# Patient Record
Sex: MALE | Race: WHITE | NOT HISPANIC OR LATINO | Employment: FULL TIME | URBAN - METROPOLITAN AREA
[De-identification: names, ages, dates, MRNs, and addresses within clinical notes are randomized per-mention and may not be internally consistent; named-entity substitution may affect disease eponyms.]

---

## 2023-02-28 LAB
ALANINE AMINOTRANSFERASE (SGPT) (U/L) IN SER/PLAS: 19 U/L (ref 10–52)
ALBUMIN (G/DL) IN SER/PLAS: 4.1 G/DL (ref 3.4–5)
ALKALINE PHOSPHATASE (U/L) IN SER/PLAS: 36 U/L (ref 33–136)
ANION GAP IN SER/PLAS: 9 MMOL/L (ref 10–20)
ASPARTATE AMINOTRANSFERASE (SGOT) (U/L) IN SER/PLAS: 19 U/L (ref 9–39)
BILIRUBIN TOTAL (MG/DL) IN SER/PLAS: 0.8 MG/DL (ref 0–1.2)
CALCIUM (MG/DL) IN SER/PLAS: 9.3 MG/DL (ref 8.6–10.3)
CARBON DIOXIDE, TOTAL (MMOL/L) IN SER/PLAS: 33 MMOL/L (ref 21–32)
CHLORIDE (MMOL/L) IN SER/PLAS: 98 MMOL/L (ref 98–107)
CHOLESTEROL (MG/DL) IN SER/PLAS: 89 MG/DL (ref 0–199)
CHOLESTEROL IN HDL (MG/DL) IN SER/PLAS: 39.9 MG/DL
CHOLESTEROL/HDL RATIO: 2.2
CREATININE (MG/DL) IN SER/PLAS: 0.96 MG/DL (ref 0.5–1.3)
ESTIMATED AVERAGE GLUCOSE FOR HBA1C: 226 MG/DL
GFR MALE: 85 ML/MIN/1.73M2
GLUCOSE (MG/DL) IN SER/PLAS: 197 MG/DL (ref 74–99)
HEMOGLOBIN A1C/HEMOGLOBIN TOTAL IN BLOOD: 9.5 %
LDL: 30 MG/DL (ref 0–99)
POTASSIUM (MMOL/L) IN SER/PLAS: 4 MMOL/L (ref 3.5–5.3)
PROTEIN TOTAL: 7.4 G/DL (ref 6.4–8.2)
SODIUM (MMOL/L) IN SER/PLAS: 136 MMOL/L (ref 136–145)
TRIGLYCERIDE (MG/DL) IN SER/PLAS: 95 MG/DL (ref 0–149)
UREA NITROGEN (MG/DL) IN SER/PLAS: 13 MG/DL (ref 6–23)
VLDL: 19 MG/DL (ref 0–40)

## 2023-03-14 DIAGNOSIS — I10 PRIMARY HYPERTENSION: Primary | ICD-10-CM

## 2023-03-14 DIAGNOSIS — E78.2 HYPERLIPIDEMIA, MIXED: ICD-10-CM

## 2023-03-14 RX ORDER — HYDROCHLOROTHIAZIDE 12.5 MG/1
TABLET ORAL
COMMUNITY
Start: 2013-06-14 | End: 2023-03-14 | Stop reason: SDUPTHER

## 2023-03-14 RX ORDER — OMEGA-3-ACID ETHYL ESTERS 1 G/1
CAPSULE, LIQUID FILLED ORAL
COMMUNITY
Start: 2007-11-30 | End: 2023-03-14 | Stop reason: SDUPTHER

## 2023-03-15 RX ORDER — OMEGA-3-ACID ETHYL ESTERS 1 G/1
1 CAPSULE, LIQUID FILLED ORAL DAILY
Qty: 90 CAPSULE | Refills: 3 | Status: SHIPPED | OUTPATIENT
Start: 2023-03-15 | End: 2024-02-07 | Stop reason: SDUPTHER

## 2023-03-15 RX ORDER — HYDROCHLOROTHIAZIDE 12.5 MG/1
12.5 TABLET ORAL DAILY
Qty: 90 TABLET | Refills: 3 | Status: SHIPPED | OUTPATIENT
Start: 2023-03-15 | End: 2023-07-03 | Stop reason: SDUPTHER

## 2023-03-20 DIAGNOSIS — E11.9 TYPE 2 DIABETES MELLITUS WITHOUT COMPLICATION, WITH LONG-TERM CURRENT USE OF INSULIN (MULTI): Primary | ICD-10-CM

## 2023-03-20 DIAGNOSIS — Z79.4 TYPE 2 DIABETES MELLITUS WITHOUT COMPLICATION, WITH LONG-TERM CURRENT USE OF INSULIN (MULTI): Primary | ICD-10-CM

## 2023-03-21 RX ORDER — (INSULIN DEGLUDEC AND LIRAGLUTIDE) 100; 3.6 [IU]/ML; MG/ML
INJECTION, SOLUTION SUBCUTANEOUS
Qty: 45 ML | Refills: 3 | Status: SHIPPED
Start: 2023-03-21 | End: 2024-02-07 | Stop reason: ALTCHOICE

## 2023-03-21 RX ORDER — (INSULIN DEGLUDEC AND LIRAGLUTIDE) 100; 3.6 [IU]/ML; MG/ML
INJECTION, SOLUTION SUBCUTANEOUS
COMMUNITY
Start: 2018-03-09 | End: 2023-07-03 | Stop reason: SDUPTHER

## 2023-07-03 ENCOUNTER — OFFICE VISIT (OUTPATIENT)
Dept: PRIMARY CARE | Facility: CLINIC | Age: 68
End: 2023-07-03
Payer: COMMERCIAL

## 2023-07-03 VITALS
TEMPERATURE: 97.5 F | HEIGHT: 76 IN | OXYGEN SATURATION: 97 % | BODY MASS INDEX: 32.15 KG/M2 | DIASTOLIC BLOOD PRESSURE: 62 MMHG | WEIGHT: 264 LBS | HEART RATE: 77 BPM | SYSTOLIC BLOOD PRESSURE: 100 MMHG

## 2023-07-03 DIAGNOSIS — E11.65 CONTROLLED TYPE 2 DIABETES MELLITUS WITH HYPERGLYCEMIA, WITH LONG-TERM CURRENT USE OF INSULIN (MULTI): Primary | ICD-10-CM

## 2023-07-03 DIAGNOSIS — Z79.4 CONTROLLED TYPE 2 DIABETES MELLITUS WITH HYPERGLYCEMIA, WITH LONG-TERM CURRENT USE OF INSULIN (MULTI): Primary | ICD-10-CM

## 2023-07-03 DIAGNOSIS — E78.2 MIXED HYPERLIPIDEMIA: ICD-10-CM

## 2023-07-03 DIAGNOSIS — I10 PRIMARY HYPERTENSION: ICD-10-CM

## 2023-07-03 DIAGNOSIS — I48.0 PAROXYSMAL ATRIAL FIBRILLATION (MULTI): ICD-10-CM

## 2023-07-03 DIAGNOSIS — J30.9 CHRONIC ALLERGIC RHINITIS: ICD-10-CM

## 2023-07-03 PROBLEM — E11.40 TYPE 2 DIABETES MELLITUS WITH DIABETIC NEUROPATHY, UNSPECIFIED (MULTI): Status: ACTIVE | Noted: 2020-04-24

## 2023-07-03 PROBLEM — E66.9 OBESITY (BMI 30-39.9): Status: ACTIVE | Noted: 2023-07-03

## 2023-07-03 PROBLEM — I50.22 CHRONIC SYSTOLIC HEART FAILURE (MULTI): Status: ACTIVE | Noted: 2023-07-03

## 2023-07-03 PROBLEM — J30.2 SEASONAL ALLERGIES: Status: ACTIVE | Noted: 2023-07-03

## 2023-07-03 PROCEDURE — 4010F ACE/ARB THERAPY RXD/TAKEN: CPT | Performed by: FAMILY MEDICINE

## 2023-07-03 PROCEDURE — 3074F SYST BP LT 130 MM HG: CPT | Performed by: FAMILY MEDICINE

## 2023-07-03 PROCEDURE — 3046F HEMOGLOBIN A1C LEVEL >9.0%: CPT | Performed by: FAMILY MEDICINE

## 2023-07-03 PROCEDURE — 99214 OFFICE O/P EST MOD 30 MIN: CPT | Performed by: FAMILY MEDICINE

## 2023-07-03 PROCEDURE — 1036F TOBACCO NON-USER: CPT | Performed by: FAMILY MEDICINE

## 2023-07-03 PROCEDURE — 3078F DIAST BP <80 MM HG: CPT | Performed by: FAMILY MEDICINE

## 2023-07-03 PROCEDURE — 1159F MED LIST DOCD IN RCRD: CPT | Performed by: FAMILY MEDICINE

## 2023-07-03 RX ORDER — LISINOPRIL 40 MG/1
40 TABLET ORAL DAILY
COMMUNITY
Start: 2009-07-17 | End: 2023-07-03 | Stop reason: SDUPTHER

## 2023-07-03 RX ORDER — LANCETS 33 GAUGE
EACH MISCELLANEOUS
COMMUNITY
Start: 2022-11-07

## 2023-07-03 RX ORDER — RIVAROXABAN 20 MG/1
20 TABLET, FILM COATED ORAL
Qty: 90 TABLET | Refills: 3 | Status: SHIPPED | OUTPATIENT
Start: 2023-07-03 | End: 2023-11-17 | Stop reason: SDUPTHER

## 2023-07-03 RX ORDER — BLOOD SUGAR DIAGNOSTIC
STRIP MISCELLANEOUS
COMMUNITY
Start: 2022-11-07

## 2023-07-03 RX ORDER — AMLODIPINE BESYLATE 5 MG/1
5 TABLET ORAL DAILY
COMMUNITY
Start: 2023-06-19 | End: 2023-11-17 | Stop reason: SDUPTHER

## 2023-07-03 RX ORDER — INSULIN ASPART 100 [IU]/ML
15 INJECTION, SOLUTION INTRAVENOUS; SUBCUTANEOUS
COMMUNITY
Start: 2015-01-16 | End: 2023-12-21

## 2023-07-03 RX ORDER — ATORVASTATIN CALCIUM TRIHYDRATE 20 MG/1
20 TABLET, FILM COATED ORAL DAILY
COMMUNITY
Start: 2011-10-21 | End: 2023-07-03 | Stop reason: SDUPTHER

## 2023-07-03 RX ORDER — RIVAROXABAN 20 MG/1
20 TABLET, FILM COATED ORAL
COMMUNITY
Start: 2022-01-05 | End: 2023-07-03 | Stop reason: SDUPTHER

## 2023-07-03 RX ORDER — FLUTICASONE PROPIONATE 50 MCG
2 SPRAY, SUSPENSION (ML) NASAL DAILY
COMMUNITY
Start: 2015-07-10 | End: 2023-07-03 | Stop reason: SDUPTHER

## 2023-07-03 RX ORDER — ATORVASTATIN CALCIUM 20 MG/1
20 TABLET, FILM COATED ORAL DAILY
Qty: 90 TABLET | Refills: 3 | Status: SHIPPED | OUTPATIENT
Start: 2023-07-03 | End: 2023-11-17 | Stop reason: SDUPTHER

## 2023-07-03 RX ORDER — METOPROLOL SUCCINATE 25 MG/1
25 TABLET, EXTENDED RELEASE ORAL DAILY
COMMUNITY
Start: 2020-10-14 | End: 2023-11-07 | Stop reason: SDUPTHER

## 2023-07-03 RX ORDER — NAPROXEN SODIUM 220 MG/1
81 TABLET, FILM COATED ORAL DAILY
COMMUNITY
Start: 2006-03-17

## 2023-07-03 RX ORDER — FLUTICASONE PROPIONATE 50 MCG
2 SPRAY, SUSPENSION (ML) NASAL DAILY
Qty: 48 G | Refills: 3 | Status: SHIPPED | OUTPATIENT
Start: 2023-07-03 | End: 2024-07-02

## 2023-07-03 RX ORDER — GABAPENTIN 300 MG/1
300 CAPSULE ORAL 3 TIMES DAILY
COMMUNITY
Start: 2020-04-24 | End: 2023-11-07 | Stop reason: ALTCHOICE

## 2023-07-03 RX ORDER — LISINOPRIL 40 MG/1
40 TABLET ORAL DAILY
Qty: 90 TABLET | Refills: 3 | Status: SHIPPED | OUTPATIENT
Start: 2023-07-03 | End: 2023-11-17 | Stop reason: SDUPTHER

## 2023-07-03 RX ORDER — HYDROCHLOROTHIAZIDE 12.5 MG/1
12.5 TABLET ORAL DAILY
Qty: 90 TABLET | Refills: 3 | Status: SHIPPED | OUTPATIENT
Start: 2023-07-03 | End: 2023-11-17 | Stop reason: SDUPTHER

## 2023-07-03 RX ORDER — METFORMIN HYDROCHLORIDE 1000 MG/1
1000 TABLET ORAL
COMMUNITY
Start: 2012-10-12 | End: 2023-09-05

## 2023-07-03 RX ORDER — PEN NEEDLE, DIABETIC 30 GX3/16"
NEEDLE, DISPOSABLE MISCELLANEOUS
Qty: 400 EACH | Refills: 3 | Status: SHIPPED | OUTPATIENT
Start: 2023-07-03

## 2023-07-03 RX ORDER — ACARBOSE 50 MG/1
50 TABLET ORAL 2 TIMES DAILY
COMMUNITY
Start: 2012-12-04 | End: 2023-11-17 | Stop reason: WASHOUT

## 2023-07-03 ASSESSMENT — ENCOUNTER SYMPTOMS: HYPERTENSION: 1

## 2023-07-03 NOTE — PROGRESS NOTES
Subjective   Patient ID: Lux Camejo is a 68 y.o. male who presents for Diabetes (Recheck), Hypertension, and Hyperlipidemia.  Diabetes    Hypertension    Hyperlipidemia      HTN: well controlled  2. DM2: A1c was 9.5.  Not giving himself mealtime insulin until 2 hrs later.  Eating more carbs in his diet.    3. Lipids: well controlled  4. Parox afib: controlled  5. AR: needs flonase refilled    Patient Active Problem List   Diagnosis    Paroxysmal atrial fibrillation (CMS/[facility])    Gastroesophageal reflux disease    Chronic systolic heart failure (CMS/[facility])    COPD (chronic obstructive pulmonary disease) (CMS/[facility])    Diabetes mellitus (CMS/[facility])    Hyperlipidemia    Essential hypertension    Obesity (BMI 30-39.9)    Obstructive sleep apnea    Seasonal allergies    Type 2 diabetes mellitus with diabetic neuropathy, unspecified (CMS/[facility])       Social Connections: Not on file       Current Outpatient Medications on File Prior to Visit   Medication Sig Dispense Refill    amLODIPine (Norvasc) 5 mg tablet Take 1 tablet (5 mg) by mouth once daily.      aspirin 81 mg chewable tablet Chew 1 tablet (81 mg) once daily.      fluticasone (Flonase) 50 mcg/actuation nasal spray Administer 2 sprays into each nostril once daily.      gabapentin (Neurontin) 300 mg capsule Take 1 capsule (300 mg) by mouth 3 times a day.      hydroCHLOROthiazide (HYDRODiuril) 12.5 mg tablet Take 1 tablet (12.5 mg) by mouth once daily. 90 tablet 3    Lipitor 20 mg tablet Take 1 tablet (20 mg) by mouth once daily.      lisinopril 40 mg tablet Take 1 tablet (40 mg) by mouth once daily.      metFORMIN (Glucophage) 1,000 mg tablet Take 1 tablet (1,000 mg) by mouth 2 times a day with meals.      metoprolol succinate XL (Toprol-XL) 25 mg 24 hr tablet Take 1 tablet (25 mg) by mouth once daily.      NovoLOG FlexPen U-100 Insulin 100 unit/mL (3 mL) pen Inject 15 Units under the skin 3 times a day before meals.      omega-3 acid ethyl esters (Lovaza) 1 gram capsule  Take 1 capsule (1 g) by mouth once daily. 90 capsule 3    OneTouch Delica Plus Lancet 30 gauge misc       OneTouch Ultra Test strip       Xarelto 20 mg tablet Take 1 tablet (20 mg) by mouth once daily in the evening. Take with meals.      Xultophy 100/3.6 100 unit-3.6 mg /mL (3 mL) insulin pen START WITH 40 UNITS DAILY AND INCREASE BY 2 UNITS A DAY UNTIL FASTING BLOOD SUGARS ARE LESS THAN 140 45 mL 3    acarbose (Precose) 50 mg tablet Take 1 tablet (50 mg) by mouth 2 times a day.      Xultophy 100/3.6 100 unit-3.6 mg /mL (3 mL) insulin pen        No current facility-administered medications on file prior to visit.        Vitals:    07/03/23 0825   BP: 100/62   Pulse: 77   Temp: 36.4 °C (97.5 °F)   SpO2: 97%     Vitals:    07/03/23 0825   Weight: 120 kg (264 lb)       Review of Systems   All other systems reviewed and are negative.      Objective     Physical Exam  Constitutional:       Appearance: Normal appearance. He is well-developed.   HENT:      Head: Atraumatic.   Cardiovascular:      Rate and Rhythm: Normal rate and regular rhythm.      Heart sounds: Normal heart sounds. No murmur heard.  Pulmonary:      Effort: Pulmonary effort is normal.      Breath sounds: Normal breath sounds.   Abdominal:      General: Bowel sounds are normal.      Palpations: Abdomen is soft.   Skin:     General: Skin is warm.   Neurological:      General: No focal deficit present.      Mental Status: He is alert.   Psychiatric:         Mood and Affect: Mood normal.         No visits with results within 2 Month(s) from this visit.   Latest known visit with results is:   Orders Only on 02/28/2023   Component Date Value Ref Range Status    Hemoglobin A1C 02/28/2023 9.5 (A)  % Final    Comment:      Diagnosis of Diabetes-Adults   Non-Diabetic: < or = 5.6%   Increased risk for developing diabetes: 5.7-6.4%   Diagnostic of diabetes: > or = 6.5%  .       Monitoring of Diabetes                Age (y)     Therapeutic Goal (%)   Adults:           ">18           <7.0   Pediatrics:    13-18           <7.5                   7-12           <8.0                   0- 6            7.5-8.5   American Diabetes Association. Diabetes Care 33(S1), Jan 2010.      Estimated Average Glucose 02/28/2023 226  MG/DL Final       Assessment/Plan   Problem List Items Addressed This Visit       Paroxysmal atrial fibrillation (CMS/[facility])    Relevant Medications    metoprolol succinate XL (Toprol-XL) 25 mg 24 hr tablet    amLODIPine (Norvasc) 5 mg tablet    Xarelto 20 mg tablet    Hyperlipidemia    Relevant Medications    atorvastatin (Lipitor) 20 mg tablet     Other Visit Diagnoses       Controlled type 2 diabetes mellitus with hyperglycemia, with long-term current use of insulin (CMS/[facility])    -  Primary    Relevant Medications    pen needle, diabetic 32 gauge x 5/32\" needle    Other Relevant Orders    Comprehensive Metabolic Panel    Hemoglobin A1C    Lipid Panel    Primary hypertension        Relevant Medications    hydroCHLOROthiazide (HYDRODiuril) 12.5 mg tablet    lisinopril 40 mg tablet    Chronic allergic rhinitis        Relevant Medications    fluticasone (Flonase) 50 mcg/actuation nasal spray          Refilled pts meds.  Encouraged mealtime insulin.  Fu in 4 mo.  "

## 2023-09-01 DIAGNOSIS — E11.65 CONTROLLED TYPE 2 DIABETES MELLITUS WITH HYPERGLYCEMIA, WITH LONG-TERM CURRENT USE OF INSULIN (MULTI): Primary | ICD-10-CM

## 2023-09-01 DIAGNOSIS — Z79.4 CONTROLLED TYPE 2 DIABETES MELLITUS WITH HYPERGLYCEMIA, WITH LONG-TERM CURRENT USE OF INSULIN (MULTI): Primary | ICD-10-CM

## 2023-09-05 RX ORDER — METFORMIN HYDROCHLORIDE 1000 MG/1
1000 TABLET ORAL 2 TIMES DAILY
Qty: 180 TABLET | Refills: 3 | Status: SHIPPED | OUTPATIENT
Start: 2023-09-05 | End: 2024-05-08 | Stop reason: SDUPTHER

## 2023-11-07 ENCOUNTER — LAB (OUTPATIENT)
Dept: LAB | Facility: LAB | Age: 68
End: 2023-11-07
Payer: COMMERCIAL

## 2023-11-07 ENCOUNTER — OFFICE VISIT (OUTPATIENT)
Dept: PRIMARY CARE | Facility: CLINIC | Age: 68
End: 2023-11-07
Payer: COMMERCIAL

## 2023-11-07 VITALS
HEART RATE: 63 BPM | SYSTOLIC BLOOD PRESSURE: 126 MMHG | DIASTOLIC BLOOD PRESSURE: 78 MMHG | BODY MASS INDEX: 32.59 KG/M2 | WEIGHT: 266 LBS | TEMPERATURE: 97.3 F | OXYGEN SATURATION: 98 %

## 2023-11-07 DIAGNOSIS — E11.65 CONTROLLED TYPE 2 DIABETES MELLITUS WITH HYPERGLYCEMIA, WITH LONG-TERM CURRENT USE OF INSULIN (MULTI): ICD-10-CM

## 2023-11-07 DIAGNOSIS — Z79.4 CONTROLLED TYPE 2 DIABETES MELLITUS WITH HYPERGLYCEMIA, WITH LONG-TERM CURRENT USE OF INSULIN (MULTI): ICD-10-CM

## 2023-11-07 DIAGNOSIS — Z12.5 SCREENING FOR PROSTATE CANCER: ICD-10-CM

## 2023-11-07 DIAGNOSIS — M79.604 PAIN IN BOTH LOWER EXTREMITIES: ICD-10-CM

## 2023-11-07 DIAGNOSIS — M79.605 PAIN IN BOTH LOWER EXTREMITIES: ICD-10-CM

## 2023-11-07 DIAGNOSIS — E11.59 TYPE 2 DIABETES MELLITUS WITH OTHER CIRCULATORY COMPLICATION, WITH LONG-TERM CURRENT USE OF INSULIN (MULTI): ICD-10-CM

## 2023-11-07 DIAGNOSIS — I48.0 PAROXYSMAL ATRIAL FIBRILLATION (MULTI): ICD-10-CM

## 2023-11-07 DIAGNOSIS — Z79.4 TYPE 2 DIABETES MELLITUS WITH OTHER CIRCULATORY COMPLICATION, WITH LONG-TERM CURRENT USE OF INSULIN (MULTI): ICD-10-CM

## 2023-11-07 DIAGNOSIS — G62.9 NEUROPATHY: ICD-10-CM

## 2023-11-07 DIAGNOSIS — I10 PRIMARY HYPERTENSION: Primary | ICD-10-CM

## 2023-11-07 LAB
ALBUMIN SERPL BCP-MCNC: 3.9 G/DL (ref 3.4–5)
ALP SERPL-CCNC: 37 U/L (ref 33–136)
ALT SERPL W P-5'-P-CCNC: 17 U/L (ref 10–52)
ANION GAP SERPL CALC-SCNC: 10 MMOL/L (ref 10–20)
AST SERPL W P-5'-P-CCNC: 18 U/L (ref 9–39)
BILIRUB SERPL-MCNC: 0.6 MG/DL (ref 0–1.2)
BUN SERPL-MCNC: 15 MG/DL (ref 6–23)
CALCIUM SERPL-MCNC: 9 MG/DL (ref 8.6–10.3)
CHLORIDE SERPL-SCNC: 102 MMOL/L (ref 98–107)
CHOLEST SERPL-MCNC: 98 MG/DL (ref 0–199)
CHOLESTEROL/HDL RATIO: 2.5
CO2 SERPL-SCNC: 31 MMOL/L (ref 21–32)
CREAT SERPL-MCNC: 0.77 MG/DL (ref 0.5–1.3)
EST. AVERAGE GLUCOSE BLD GHB EST-MCNC: 189 MG/DL
GFR SERPL CREATININE-BSD FRML MDRD: >90 ML/MIN/1.73M*2
GLUCOSE SERPL-MCNC: 169 MG/DL (ref 74–99)
HBA1C MFR BLD: 8.2 %
HDLC SERPL-MCNC: 38.9 MG/DL
LDLC SERPL CALC-MCNC: 38 MG/DL
NON HDL CHOLESTEROL: 59 MG/DL (ref 0–149)
POTASSIUM SERPL-SCNC: 4.4 MMOL/L (ref 3.5–5.3)
PROT SERPL-MCNC: 6.7 G/DL (ref 6.4–8.2)
PSA SERPL-MCNC: 0.67 NG/ML
SODIUM SERPL-SCNC: 139 MMOL/L (ref 136–145)
TRIGL SERPL-MCNC: 104 MG/DL (ref 0–149)
VLDL: 21 MG/DL (ref 0–40)

## 2023-11-07 PROCEDURE — 1159F MED LIST DOCD IN RCRD: CPT | Performed by: FAMILY MEDICINE

## 2023-11-07 PROCEDURE — 3078F DIAST BP <80 MM HG: CPT | Performed by: FAMILY MEDICINE

## 2023-11-07 PROCEDURE — 80061 LIPID PANEL: CPT

## 2023-11-07 PROCEDURE — 4010F ACE/ARB THERAPY RXD/TAKEN: CPT | Performed by: FAMILY MEDICINE

## 2023-11-07 PROCEDURE — 3046F HEMOGLOBIN A1C LEVEL >9.0%: CPT | Performed by: FAMILY MEDICINE

## 2023-11-07 PROCEDURE — 80053 COMPREHEN METABOLIC PANEL: CPT

## 2023-11-07 PROCEDURE — 36415 COLL VENOUS BLD VENIPUNCTURE: CPT

## 2023-11-07 PROCEDURE — 83036 HEMOGLOBIN GLYCOSYLATED A1C: CPT

## 2023-11-07 PROCEDURE — 1036F TOBACCO NON-USER: CPT | Performed by: FAMILY MEDICINE

## 2023-11-07 PROCEDURE — 84153 ASSAY OF PSA TOTAL: CPT

## 2023-11-07 PROCEDURE — 3074F SYST BP LT 130 MM HG: CPT | Performed by: FAMILY MEDICINE

## 2023-11-07 PROCEDURE — 99214 OFFICE O/P EST MOD 30 MIN: CPT | Performed by: FAMILY MEDICINE

## 2023-11-07 RX ORDER — METOPROLOL SUCCINATE 25 MG/1
25 TABLET, EXTENDED RELEASE ORAL DAILY
Qty: 90 TABLET | Refills: 1 | Status: SHIPPED | OUTPATIENT
Start: 2023-11-07 | End: 2023-11-17 | Stop reason: SDUPTHER

## 2023-11-07 RX ORDER — GABAPENTIN 400 MG/1
400 CAPSULE ORAL 3 TIMES DAILY
Qty: 270 CAPSULE | Refills: 1 | Status: SHIPPED | OUTPATIENT
Start: 2023-11-07 | End: 2024-05-08 | Stop reason: SDUPTHER

## 2023-11-07 NOTE — PROGRESS NOTES
Subjective   Patient ID: Lux Camejo is a 68 y.o. male who presents for Diabetes (Recheck.).  Diabetes      HPI: Diabetes, hypertension and increased cholesterol.       All  Diabetes: No medication side effects noted. Trying to follow recommended diet. Patient is exercising. Hypoglycemic Episodes: None.    End All         Lipids well controlled last check, checking today.        Afib: stable.      DM2 is pending.      Patient Active Problem List   Diagnosis    Paroxysmal atrial fibrillation (CMS/HCC)    Gastroesophageal reflux disease    Chronic systolic heart failure (CMS/HCC)    COPD (chronic obstructive pulmonary disease) (CMS/HCC)    Diabetes mellitus (CMS/HCC)    Hyperlipidemia    Essential hypertension    Obesity (BMI 30-39.9)    Obstructive sleep apnea    Seasonal allergies    Type 2 diabetes mellitus with diabetic neuropathy, unspecified (CMS/[facility])       Social Connections: Not on file       Current Outpatient Medications on File Prior to Visit   Medication Sig Dispense Refill    acarbose (Precose) 50 mg tablet Take 1 tablet (50 mg) by mouth 2 times a day.      amLODIPine (Norvasc) 5 mg tablet Take 1 tablet (5 mg) by mouth once daily.      aspirin 81 mg chewable tablet Chew 1 tablet (81 mg) once daily.      atorvastatin (Lipitor) 20 mg tablet Take 1 tablet (20 mg) by mouth once daily. 90 tablet 3    fluticasone (Flonase) 50 mcg/actuation nasal spray Administer 2 sprays into each nostril once daily. 48 g 3    gabapentin (Neurontin) 300 mg capsule Take 1 capsule (300 mg) by mouth 3 times a day.      hydroCHLOROthiazide (HYDRODiuril) 12.5 mg tablet Take 1 tablet (12.5 mg) by mouth once daily. 90 tablet 3    lisinopril 40 mg tablet Take 1 tablet (40 mg) by mouth once daily. 90 tablet 3    metFORMIN (Glucophage) 1,000 mg tablet TAKE ONE TABLET BY MOUTH TWICE A  tablet 3    metoprolol succinate XL (Toprol-XL) 25 mg 24 hr tablet Take 1 tablet (25 mg) by mouth once daily.      NovoLOG FlexPen U-100 Insulin  "100 unit/mL (3 mL) pen Inject 15 Units under the skin 3 times a day before meals.      omega-3 acid ethyl esters (Lovaza) 1 gram capsule Take 1 capsule (1 g) by mouth once daily. 90 capsule 3    OneTouch Delica Plus Lancet 30 gauge misc       OneTouch Ultra Test strip       pen needle, diabetic 32 gauge x 5/32\" needle To be used 4x a day 400 each 3    Xarelto 20 mg tablet Take 1 tablet (20 mg) by mouth once daily in the evening. Take with meals. 90 tablet 3    Xultophy 100/3.6 100 unit-3.6 mg /mL (3 mL) insulin pen START WITH 40 UNITS DAILY AND INCREASE BY 2 UNITS A DAY UNTIL FASTING BLOOD SUGARS ARE LESS THAN 140 45 mL 3     No current facility-administered medications on file prior to visit.        Vitals:    11/07/23 0805   BP: 126/78   Pulse: 63   Temp: 36.3 °C (97.3 °F)   SpO2: 98%     Vitals:    11/07/23 0805   Weight: 121 kg (266 lb)       Review of Systems   All other systems reviewed and are negative.      Objective     Physical Exam  Constitutional:       Appearance: Normal appearance. He is well-developed.   HENT:      Head: Atraumatic.   Cardiovascular:      Rate and Rhythm: Normal rate and regular rhythm.      Heart sounds: Normal heart sounds. No murmur heard.  Pulmonary:      Effort: Pulmonary effort is normal.      Breath sounds: Normal breath sounds.   Abdominal:      General: Bowel sounds are normal.      Palpations: Abdomen is soft.   Feet:      Right foot:      Skin integrity: Skin integrity normal.      Left foot:      Skin integrity: Skin integrity normal.   Skin:     General: Skin is warm.   Neurological:      General: No focal deficit present.      Mental Status: He is alert.   Psychiatric:         Mood and Affect: Mood normal.     Decreased sensation    No visits with results within 2 Month(s) from this visit.   Latest known visit with results is:   Orders Only on 02/28/2023   Component Date Value Ref Range Status    Hemoglobin A1C 02/28/2023 9.5 (A)  % Final    Comment:      Diagnosis of " Diabetes-Adults   Non-Diabetic: < or = 5.6%   Increased risk for developing diabetes: 5.7-6.4%   Diagnostic of diabetes: > or = 6.5%  .       Monitoring of Diabetes                Age (y)     Therapeutic Goal (%)   Adults:          >18           <7.0   Pediatrics:    13-18           <7.5                   7-12           <8.0                   0- 6            7.5-8.5   American Diabetes Association. Diabetes Care 33(S1), Jan 2010.      Estimated Average Glucose 02/28/2023 226  MG/DL Final       Assessment/Plan   Problem List Items Addressed This Visit    None  Visit Diagnoses         Codes    Primary hypertension    -  Primary I10    Relevant Medications    metoprolol succinate XL (Toprol-XL) 25 mg 24 hr tablet    Neuropathy     G62.9    Relevant Medications    gabapentin (Neurontin) 400 mg capsule    Pain in both lower extremities     M79.604, M79.605    Relevant Medications    gabapentin (Neurontin) 400 mg capsule    Other Relevant Orders    Vascular US ankle brachial index (SHERRON) with exercise    Screening for prostate cancer     Z12.5    Relevant Orders    Prostate Specific Antigen    Controlled type 2 diabetes mellitus with hyperglycemia, with long-term current use of insulin (CMS/[facility])     E11.65, Z79.4    Relevant Orders    Comprehensive Metabolic Panel    Hemoglobin A1C    Lipid Panel          Doing well.  Refilled meds.  Follow up in 4 mo.  Checking SHERRON for leg cramping.  Inc gabapentin.

## 2023-11-08 ENCOUNTER — TELEPHONE (OUTPATIENT)
Dept: PRIMARY CARE | Facility: CLINIC | Age: 68
End: 2023-11-08
Payer: COMMERCIAL

## 2023-11-08 NOTE — RESULT ENCOUNTER NOTE
Pts A1c was 8.2, better than last time.  Other BW looked good.  Hopefully we can get his A1c below 8 for his neuropathy.

## 2023-11-08 NOTE — TELEPHONE ENCOUNTER
----- Message from Jason De Anda MD sent at 11/7/2023  9:10 PM EST -----  Pts A1c was 8.2, better than last time.  Other BW looked good.  Hopefully we can get his A1c below 8 for his neuropathy.

## 2023-11-17 ENCOUNTER — TELEPHONE (OUTPATIENT)
Dept: PRIMARY CARE | Facility: CLINIC | Age: 68
End: 2023-11-17

## 2023-11-17 ENCOUNTER — OFFICE VISIT (OUTPATIENT)
Dept: CARDIOLOGY | Facility: CLINIC | Age: 68
End: 2023-11-17
Payer: COMMERCIAL

## 2023-11-17 ENCOUNTER — HOSPITAL ENCOUNTER (OUTPATIENT)
Dept: VASCULAR MEDICINE | Facility: HOSPITAL | Age: 68
Discharge: HOME | End: 2023-11-17
Payer: COMMERCIAL

## 2023-11-17 VITALS
HEART RATE: 90 BPM | BODY MASS INDEX: 32.32 KG/M2 | WEIGHT: 265.4 LBS | DIASTOLIC BLOOD PRESSURE: 80 MMHG | HEIGHT: 76 IN | SYSTOLIC BLOOD PRESSURE: 128 MMHG

## 2023-11-17 DIAGNOSIS — I48.0 PAROXYSMAL ATRIAL FIBRILLATION (MULTI): Primary | ICD-10-CM

## 2023-11-17 DIAGNOSIS — R94.30 ABNORMAL RESULT OF CARDIOVASCULAR FUNCTION STUDY: ICD-10-CM

## 2023-11-17 DIAGNOSIS — M79.605 PAIN IN BOTH LOWER EXTREMITIES: ICD-10-CM

## 2023-11-17 DIAGNOSIS — E78.2 MIXED HYPERLIPIDEMIA: ICD-10-CM

## 2023-11-17 DIAGNOSIS — I50.22 CHRONIC SYSTOLIC HEART FAILURE (MULTI): ICD-10-CM

## 2023-11-17 DIAGNOSIS — I73.9 PERIPHERAL VASCULAR DISEASE, UNSPECIFIED (CMS-HCC): ICD-10-CM

## 2023-11-17 DIAGNOSIS — I10 PRIMARY HYPERTENSION: ICD-10-CM

## 2023-11-17 DIAGNOSIS — M79.604 PAIN IN BOTH LOWER EXTREMITIES: ICD-10-CM

## 2023-11-17 PROCEDURE — 93922 UPR/L XTREMITY ART 2 LEVELS: CPT

## 2023-11-17 PROCEDURE — 3052F HG A1C>EQUAL 8.0%<EQUAL 9.0%: CPT | Performed by: INTERNAL MEDICINE

## 2023-11-17 PROCEDURE — 93000 ELECTROCARDIOGRAM COMPLETE: CPT | Performed by: INTERNAL MEDICINE

## 2023-11-17 PROCEDURE — 4010F ACE/ARB THERAPY RXD/TAKEN: CPT | Performed by: INTERNAL MEDICINE

## 2023-11-17 PROCEDURE — 3048F LDL-C <100 MG/DL: CPT | Performed by: INTERNAL MEDICINE

## 2023-11-17 PROCEDURE — 1036F TOBACCO NON-USER: CPT | Performed by: INTERNAL MEDICINE

## 2023-11-17 PROCEDURE — 1159F MED LIST DOCD IN RCRD: CPT | Performed by: INTERNAL MEDICINE

## 2023-11-17 PROCEDURE — 3079F DIAST BP 80-89 MM HG: CPT | Performed by: INTERNAL MEDICINE

## 2023-11-17 PROCEDURE — 99214 OFFICE O/P EST MOD 30 MIN: CPT | Performed by: INTERNAL MEDICINE

## 2023-11-17 PROCEDURE — 93922 UPR/L XTREMITY ART 2 LEVELS: CPT | Performed by: INTERNAL MEDICINE

## 2023-11-17 PROCEDURE — 3074F SYST BP LT 130 MM HG: CPT | Performed by: INTERNAL MEDICINE

## 2023-11-17 RX ORDER — ATORVASTATIN CALCIUM 20 MG/1
20 TABLET, FILM COATED ORAL DAILY
Qty: 90 TABLET | Refills: 3 | Status: SHIPPED | OUTPATIENT
Start: 2023-11-17 | End: 2024-05-08 | Stop reason: SDUPTHER

## 2023-11-17 RX ORDER — LISINOPRIL 40 MG/1
40 TABLET ORAL DAILY
Qty: 90 TABLET | Refills: 3 | Status: SHIPPED | OUTPATIENT
Start: 2023-11-17 | End: 2024-05-08 | Stop reason: SDUPTHER

## 2023-11-17 RX ORDER — METOPROLOL SUCCINATE 25 MG/1
25 TABLET, EXTENDED RELEASE ORAL DAILY
Qty: 90 TABLET | Refills: 3 | Status: SHIPPED | OUTPATIENT
Start: 2023-11-17 | End: 2024-05-08 | Stop reason: SDUPTHER

## 2023-11-17 RX ORDER — AMLODIPINE BESYLATE 5 MG/1
5 TABLET ORAL DAILY
Qty: 90 TABLET | Refills: 3 | Status: SHIPPED | OUTPATIENT
Start: 2023-11-17 | End: 2024-11-16

## 2023-11-17 RX ORDER — RIVAROXABAN 20 MG/1
20 TABLET, FILM COATED ORAL
Qty: 90 TABLET | Refills: 3 | Status: SHIPPED | OUTPATIENT
Start: 2023-11-17 | End: 2024-05-08 | Stop reason: SDUPTHER

## 2023-11-17 RX ORDER — HYDROCHLOROTHIAZIDE 12.5 MG/1
12.5 TABLET ORAL DAILY
Qty: 90 TABLET | Refills: 3 | Status: SHIPPED | OUTPATIENT
Start: 2023-11-17 | End: 2024-05-08 | Stop reason: SDUPTHER

## 2023-11-17 NOTE — PATIENT INSTRUCTIONS
Thanks for following up in office today.    1)  Let me know if you are having any worsening shortness of breath, dizziness, fast or irregular heart rates that are interfering with your activity, or chest pain.    2)  Watch out for bleeding on Xarelto, let us know if you develop this.  If you fall and hit your head, call 911.    3)  Please continue your cardiac medications as prescribed.    Follow up with Jovanny Samson in 6 months  If you have any questions, please call [phone] and choose option for Dr. Walker's nurse Shelley Devine

## 2023-11-17 NOTE — TELEPHONE ENCOUNTER
----- Message from Jason De Anda MD sent at 11/17/2023  2:43 PM EST -----  Please let patient know that his SHERRON showed good blood flow to his feet

## 2023-11-17 NOTE — PROGRESS NOTES
"Chief Complaint:   No chief complaint on file.     History Of Present Illness:    Lux Camejo is a 68 y.o. male presenting for yearly follow up  He has  h/o Mild LV dysfunction EF 45%, HTN, DL, permanent Afib, Elevated CAC Score (Total 206), CITLALLI intermittent CPAP compliance, COPD/emphysema, GERD who presents for follow up. He was originally referred for preop risk stratification prior to colonoscopy and continues to follow with us for cardiology care.    Since our last visit he tells me that he is doing well from a CV standpoint. He does have some numbness in the bottom of both feet - was told by Dr. De Anda that this is likely 2/2 diabetes.  He has an ultrasound later today to evaluate blood flow.      Can walk two flights four to five times a day without chest pain.  Feels a little winded, but this is not worsening.  No Le edema, eating a low sodium diet.  No reported irregular or fast heart rates.  Rarely he can feel a skipped beat on his pulse.    ROS:  The remainder of the review of systems was obtained, as was negative as pertains to the chief complaint.       CV Testing Reviewed Today:    Stress test October 5, 2022 shows no EKG evidence for ischemia, moderate size partially reversible perfusion defect inferior and apical walls suggesting prior infarct with small area of dwayne-infarct ischemia.     TTE 9/2020, which demonstrated LVEF 45%, RV mildly enlgd with mild RV dysfcn     CT calcium score 9/2020: Total= 206 (LM 0, LAD 1.4, LCx 123, RCA 82).      Last Recorded Vitals:  Vitals:    11/17/23 0946   BP: 128/80   Pulse: 90   Weight: 120 kg (265 lb 6.4 oz)   Height: 1.93 m (6' 4\")       Past Medical History:  He has a past medical history of Personal history of other diseases of the respiratory system and Personal history of pulmonary embolism.    Past Surgical History:  He has a past surgical history that includes Other surgical history (08/31/2020) and Other surgical history (10/14/2020).      Social " "History:  He reports that he quit smoking about 17 years ago. His smoking use included cigarettes and cigars. He smoked an average of 1 pack per day. He has never used smokeless tobacco. He reports current alcohol use of about 5.0 - 10.0 standard drinks of alcohol per week. He reports that he does not use drugs.    Family History:  Family History   Problem Relation Name Age of Onset    Cancer Mother      Heart attack Father          Allergies:  Patient has no known allergies.    Outpatient Medications:  Current Outpatient Medications   Medication Instructions    acarbose (PRECOSE) 50 mg, oral, 2 times daily    amLODIPine (NORVASC) 5 mg, oral, Daily    aspirin 81 mg, oral, Daily    atorvastatin (LIPITOR) 20 mg, oral, Daily    fluticasone (Flonase) 50 mcg/actuation nasal spray 2 sprays, Each Nostril, Daily    gabapentin (NEURONTIN) 400 mg, oral, 3 times daily    hydroCHLOROthiazide (HYDRODIURIL) 12.5 mg, oral, Daily    lisinopril 40 mg, oral, Daily    metFORMIN (GLUCOPHAGE) 1,000 mg, oral, 2 times daily    metoprolol succinate XL (TOPROL-XL) 25 mg, oral, Daily    NovoLOG Flexpen U-100 Insulin 15 Units, subcutaneous, 3 times daily before meals    omega-3 acid ethyl esters (LOVAZA) 1 g, oral, Daily    OneTouch Delica Plus Lancet 30 gauge misc     OneTouch Ultra Test strip     pen needle, diabetic 32 gauge x 5/32\" needle To be used 4x a day    Xarelto 20 mg, oral, Daily with evening meal    Xultophy 100/3.6 100 unit-3.6 mg /mL (3 mL) insulin pen START WITH 40 UNITS DAILY AND INCREASE BY 2 UNITS A DAY UNTIL FASTING BLOOD SUGARS ARE LESS THAN 140       Physical Exam:  Physical Exam  HENT:      Head: Normocephalic.      Nose: Nose normal.      Mouth/Throat:      Mouth: Mucous membranes are moist.   Eyes:      Pupils: Pupils are equal, round, and reactive to light.   Cardiovascular:      Rate and Rhythm: Normal rate. Rhythm irregularly irregular.      Comments: No significant murmur  No carotid bruits  Pulmonary:      " Effort: Pulmonary effort is normal.      Breath sounds: Normal breath sounds.   Abdominal:      Palpations: Abdomen is soft.   Musculoskeletal:         General: Normal range of motion.   Skin:     General: Skin is warm and dry.   Neurological:      General: No focal deficit present.      Mental Status: He is alert.           Last Labs:  CBC -  Lab Results   Component Value Date    WBC 8.2 03/22/2022    HGB 14.7 03/22/2022    HCT 46.6 03/22/2022    MCV 96 03/22/2022     03/22/2022       CMP -  Lab Results   Component Value Date    CALCIUM 9.0 11/07/2023    PROT 6.7 11/07/2023    ALBUMIN 3.9 11/07/2023    AST 18 11/07/2023    ALT 17 11/07/2023    ALKPHOS 37 11/07/2023    BILITOT 0.6 11/07/2023       LIPID PANEL -   Lab Results   Component Value Date    CHOL 98 11/07/2023    TRIG 104 11/07/2023    HDL 38.9 11/07/2023    CHHDL 2.5 11/07/2023    LDLF 30 02/28/2023    VLDL 21 11/07/2023    NHDL 59 11/07/2023       RENAL FUNCTION PANEL -   Lab Results   Component Value Date    GLUCOSE 169 (H) 11/07/2023     11/07/2023    K 4.4 11/07/2023     11/07/2023    CO2 31 11/07/2023    ANIONGAP 10 11/07/2023    BUN 15 11/07/2023    CREATININE 0.77 11/07/2023    GFRMALE 85 02/28/2023    CALCIUM 9.0 11/07/2023    ALBUMIN 3.9 11/07/2023        Lab Results   Component Value Date    HGBA1C 8.2 (H) 11/07/2023       Last Cardiology Tests:  ECG:  No results found for this or any previous visit from the past 1095 days.    Stress Test:  Nuclear Stress Test 3/7/2023      Assessment/Plan   Chronic atrial fibrillation:  Permanent Afib, rate controlled with some PVC's on EKG today.  -continue metoprolol for rate control  -patient is anticoagulated with Xarelto. He is not having any abnormal bleeding or bruising and will continue on anticoagulation.     Abnormal stress test:  10/22 stress test with scar with possible small area of dwayne-infarct ischemia.  No chest pain at what sounds like a moderate level of activity.     -aspirin, atorvastatin, metoprolol, amlodipine  FLP reviewed and at goal    HTN:  aspirin, atorvastatin, metoprolol, lisinopril, amlodipine, and hydrochlorothiazide.     Hyperlipidemia: Patient's recent lipid labs show excellent control  -Continue on atorvastatin 20 mg daily.

## 2023-12-20 DIAGNOSIS — E11.65 CONTROLLED TYPE 2 DIABETES MELLITUS WITH HYPERGLYCEMIA, WITH LONG-TERM CURRENT USE OF INSULIN (MULTI): Primary | ICD-10-CM

## 2023-12-20 DIAGNOSIS — Z79.4 CONTROLLED TYPE 2 DIABETES MELLITUS WITH HYPERGLYCEMIA, WITH LONG-TERM CURRENT USE OF INSULIN (MULTI): Primary | ICD-10-CM

## 2023-12-21 RX ORDER — INSULIN ASPART 100 [IU]/ML
INJECTION, SOLUTION INTRAVENOUS; SUBCUTANEOUS
Qty: 39 ML | Refills: 11 | Status: SHIPPED | OUTPATIENT
Start: 2023-12-21

## 2024-02-07 ENCOUNTER — OFFICE VISIT (OUTPATIENT)
Dept: PRIMARY CARE | Facility: CLINIC | Age: 69
End: 2024-02-07
Payer: COMMERCIAL

## 2024-02-07 ENCOUNTER — TELEPHONE (OUTPATIENT)
Dept: PRIMARY CARE | Facility: CLINIC | Age: 69
End: 2024-02-07

## 2024-02-07 ENCOUNTER — LAB (OUTPATIENT)
Dept: LAB | Facility: LAB | Age: 69
End: 2024-02-07
Payer: COMMERCIAL

## 2024-02-07 VITALS
DIASTOLIC BLOOD PRESSURE: 80 MMHG | HEART RATE: 50 BPM | OXYGEN SATURATION: 97 % | BODY MASS INDEX: 32.38 KG/M2 | TEMPERATURE: 97.4 F | SYSTOLIC BLOOD PRESSURE: 140 MMHG | WEIGHT: 266 LBS

## 2024-02-07 DIAGNOSIS — Z79.4 CONTROLLED TYPE 2 DIABETES MELLITUS WITH HYPERGLYCEMIA, WITH LONG-TERM CURRENT USE OF INSULIN (MULTI): ICD-10-CM

## 2024-02-07 DIAGNOSIS — Z79.4 CONTROLLED TYPE 2 DIABETES MELLITUS WITH HYPERGLYCEMIA, WITH LONG-TERM CURRENT USE OF INSULIN (MULTI): Primary | ICD-10-CM

## 2024-02-07 DIAGNOSIS — I10 PRIMARY HYPERTENSION: ICD-10-CM

## 2024-02-07 DIAGNOSIS — E11.65 CONTROLLED TYPE 2 DIABETES MELLITUS WITH HYPERGLYCEMIA, WITH LONG-TERM CURRENT USE OF INSULIN (MULTI): ICD-10-CM

## 2024-02-07 DIAGNOSIS — E11.65 CONTROLLED TYPE 2 DIABETES MELLITUS WITH HYPERGLYCEMIA, WITH LONG-TERM CURRENT USE OF INSULIN (MULTI): Primary | ICD-10-CM

## 2024-02-07 DIAGNOSIS — E78.2 HYPERLIPIDEMIA, MIXED: ICD-10-CM

## 2024-02-07 DIAGNOSIS — Z12.5 SCREENING FOR PROSTATE CANCER: ICD-10-CM

## 2024-02-07 LAB
ALBUMIN SERPL BCP-MCNC: 4.1 G/DL (ref 3.4–5)
ALP SERPL-CCNC: 30 U/L (ref 33–136)
ALT SERPL W P-5'-P-CCNC: 17 U/L (ref 10–52)
ANION GAP SERPL CALC-SCNC: 9 MMOL/L (ref 10–20)
AST SERPL W P-5'-P-CCNC: 17 U/L (ref 9–39)
BILIRUB SERPL-MCNC: 0.6 MG/DL (ref 0–1.2)
BUN SERPL-MCNC: 13 MG/DL (ref 6–23)
CALCIUM SERPL-MCNC: 9.3 MG/DL (ref 8.6–10.3)
CHLORIDE SERPL-SCNC: 100 MMOL/L (ref 98–107)
CHOLEST SERPL-MCNC: 87 MG/DL (ref 0–199)
CHOLESTEROL/HDL RATIO: 2.1
CO2 SERPL-SCNC: 33 MMOL/L (ref 21–32)
CREAT SERPL-MCNC: 0.81 MG/DL (ref 0.5–1.3)
EGFRCR SERPLBLD CKD-EPI 2021: >90 ML/MIN/1.73M*2
EST. AVERAGE GLUCOSE BLD GHB EST-MCNC: 214 MG/DL
GLUCOSE SERPL-MCNC: 167 MG/DL (ref 74–99)
HBA1C MFR BLD: 9.1 %
HDLC SERPL-MCNC: 41.1 MG/DL
LDLC SERPL CALC-MCNC: 30 MG/DL
NON HDL CHOLESTEROL: 46 MG/DL (ref 0–149)
POTASSIUM SERPL-SCNC: 4.3 MMOL/L (ref 3.5–5.3)
PROT SERPL-MCNC: 7.1 G/DL (ref 6.4–8.2)
PSA SERPL-MCNC: 0.52 NG/ML
SODIUM SERPL-SCNC: 138 MMOL/L (ref 136–145)
TRIGL SERPL-MCNC: 78 MG/DL (ref 0–149)
VLDL: 16 MG/DL (ref 0–40)

## 2024-02-07 PROCEDURE — 99214 OFFICE O/P EST MOD 30 MIN: CPT | Performed by: FAMILY MEDICINE

## 2024-02-07 PROCEDURE — 83036 HEMOGLOBIN GLYCOSYLATED A1C: CPT

## 2024-02-07 PROCEDURE — 80053 COMPREHEN METABOLIC PANEL: CPT

## 2024-02-07 PROCEDURE — 80061 LIPID PANEL: CPT

## 2024-02-07 PROCEDURE — 1036F TOBACCO NON-USER: CPT | Performed by: FAMILY MEDICINE

## 2024-02-07 PROCEDURE — 3077F SYST BP >= 140 MM HG: CPT | Performed by: FAMILY MEDICINE

## 2024-02-07 PROCEDURE — 4010F ACE/ARB THERAPY RXD/TAKEN: CPT | Performed by: FAMILY MEDICINE

## 2024-02-07 PROCEDURE — 1159F MED LIST DOCD IN RCRD: CPT | Performed by: FAMILY MEDICINE

## 2024-02-07 PROCEDURE — 84153 ASSAY OF PSA TOTAL: CPT

## 2024-02-07 PROCEDURE — 36415 COLL VENOUS BLD VENIPUNCTURE: CPT

## 2024-02-07 PROCEDURE — 3079F DIAST BP 80-89 MM HG: CPT | Performed by: FAMILY MEDICINE

## 2024-02-07 RX ORDER — BLOOD-GLUCOSE,RECEIVER,CONT
EACH MISCELLANEOUS
Qty: 1 EACH | Refills: 0 | Status: SHIPPED | OUTPATIENT
Start: 2024-02-07

## 2024-02-07 RX ORDER — BLOOD-GLUCOSE SENSOR
EACH MISCELLANEOUS
Qty: 6 EACH | Refills: 3 | Status: CANCELLED | OUTPATIENT
Start: 2024-02-07

## 2024-02-07 RX ORDER — TIRZEPATIDE 12.5 MG/.5ML
12.5 INJECTION, SOLUTION SUBCUTANEOUS
Qty: 6 ML | Refills: 1 | Status: SHIPPED | OUTPATIENT
Start: 2024-02-07

## 2024-02-07 RX ORDER — BLOOD-GLUCOSE SENSOR
EACH MISCELLANEOUS
Qty: 8 EACH | Refills: 3 | Status: SHIPPED | OUTPATIENT
Start: 2024-02-07

## 2024-02-07 RX ORDER — OMEGA-3-ACID ETHYL ESTERS 1 G/1
1 CAPSULE, LIQUID FILLED ORAL DAILY
Qty: 90 CAPSULE | Refills: 3 | Status: SHIPPED | OUTPATIENT
Start: 2024-02-07 | End: 2025-02-06

## 2024-02-07 RX ORDER — OMEGA-3-ACID ETHYL ESTERS 1 G/1
1 CAPSULE, LIQUID FILLED ORAL DAILY
Qty: 90 CAPSULE | Refills: 3 | Status: SHIPPED | OUTPATIENT
Start: 2024-02-07 | End: 2024-02-07 | Stop reason: SDUPTHER

## 2024-02-07 RX ORDER — BLOOD-GLUCOSE SENSOR
EACH MISCELLANEOUS
Qty: 6 EACH | Refills: 3 | Status: SHIPPED | OUTPATIENT
Start: 2024-02-07

## 2024-02-07 NOTE — TELEPHONE ENCOUNTER
"Pended med requires PA    Preferred:    \"Available Formulary Alternative: Dexcom continuous glucose monitor \"    Change med ?  Please advise. Thx  "

## 2024-02-07 NOTE — TELEPHONE ENCOUNTER
----- Message from Jason De Anda MD sent at 2/7/2024  1:03 PM EST -----  Pts A1c was 9, so needs to get better controlled.   Hopefully the Mounjaro will help pt to control his diet.

## 2024-02-07 NOTE — RESULT ENCOUNTER NOTE
Pts A1c was 9, so needs to get better controlled.   Hopefully the Mounjaro will help pt to control his diet.

## 2024-02-07 NOTE — PROGRESS NOTES
Subjective   Patient ID: Lux Camejo is a 68 y.o. male who presents for Diabetes, Hypertension, and Hyperlipidemia.  Diabetes      HPI: Diabetes, hypertension and increased cholesterol.       All  Diabetes: No medication side effects noted. Trying to follow recommended diet. Patient is exercising. Hypoglycemic Episodes: None.    End All         Lipids well controlled last check, checking today.        Afib: stable.      DM2 is pending.      Patient Active Problem List   Diagnosis    Paroxysmal atrial fibrillation (CMS/HCC)    Gastroesophageal reflux disease    Chronic systolic heart failure (CMS/HCC)    COPD (chronic obstructive pulmonary disease) (CMS/[facility])    Diabetes mellitus (CMS/HCC)    Hyperlipidemia    Essential hypertension    Obesity (BMI 30-39.9)    Obstructive sleep apnea    Seasonal allergies    Type 2 diabetes mellitus with diabetic neuropathy, unspecified (CMS/[facility])       Social Connections: Not on file       Current Outpatient Medications on File Prior to Visit   Medication Sig Dispense Refill    amLODIPine (Norvasc) 5 mg tablet Take 1 tablet (5 mg) by mouth once daily. 90 tablet 3    aspirin 81 mg chewable tablet Chew 1 tablet (81 mg) once daily.      atorvastatin (Lipitor) 20 mg tablet Take 1 tablet (20 mg) by mouth once daily. 90 tablet 3    fluticasone (Flonase) 50 mcg/actuation nasal spray Administer 2 sprays into each nostril once daily. 48 g 3    gabapentin (Neurontin) 400 mg capsule Take 1 capsule (400 mg) by mouth 3 times a day. 270 capsule 1    hydroCHLOROthiazide (HYDRODiuril) 12.5 mg tablet Take 1 tablet (12.5 mg) by mouth once daily. 90 tablet 3    lisinopril 40 mg tablet Take 1 tablet (40 mg) by mouth once daily. 90 tablet 3    metFORMIN (Glucophage) 1,000 mg tablet TAKE ONE TABLET BY MOUTH TWICE A  tablet 3    metoprolol succinate XL (Toprol-XL) 25 mg 24 hr tablet Take 1 tablet (25 mg) by mouth once daily. 90 tablet 3    NovoLOG Flexpen U-100 Insulin 100 unit/mL (3 mL) pen  "INJECT 15 UNITS UNDER THE SKIN BEFORE EACH MEAL THREE TIMES A DAY 39 mL 11    OneTouch Delica Plus Lancet 30 gauge misc       OneTouch Ultra Test strip       pen needle, diabetic 32 gauge x 5/32\" needle To be used 4x a day 400 each 3    Xarelto 20 mg tablet Take 1 tablet (20 mg) by mouth once daily in the evening. Take with meals. 90 tablet 3    Xultophy 100/3.6 100 unit-3.6 mg /mL (3 mL) insulin pen START WITH 40 UNITS DAILY AND INCREASE BY 2 UNITS A DAY UNTIL FASTING BLOOD SUGARS ARE LESS THAN 140 45 mL 3    [DISCONTINUED] omega-3 acid ethyl esters (Lovaza) 1 gram capsule Take 1 capsule (1 g) by mouth once daily. 90 capsule 3     No current facility-administered medications on file prior to visit.        Vitals:    02/07/24 0803   BP: 140/80   Pulse: 50   Temp: 36.3 °C (97.4 °F)   SpO2: 97%     Vitals:    02/07/24 0803   Weight: 121 kg (266 lb)       Review of Systems   All other systems reviewed and are negative.      Objective     Physical Exam  Constitutional:       Appearance: Normal appearance. He is well-developed.   HENT:      Head: Atraumatic.   Cardiovascular:      Rate and Rhythm: Normal rate and regular rhythm.      Heart sounds: Normal heart sounds. No murmur heard.  Pulmonary:      Effort: Pulmonary effort is normal.      Breath sounds: Normal breath sounds.   Abdominal:      General: Bowel sounds are normal.      Palpations: Abdomen is soft.   Feet:      Right foot:      Skin integrity: Skin integrity normal.      Left foot:      Skin integrity: Skin integrity normal.   Skin:     General: Skin is warm.   Neurological:      General: No focal deficit present.      Mental Status: He is alert.   Psychiatric:         Mood and Affect: Mood normal.     Decreased sensation    No visits with results within 2 Month(s) from this visit.   Latest known visit with results is:   Lab on 11/07/2023   Component Date Value Ref Range Status    Glucose 11/07/2023 169 (H)  74 - 99 mg/dL Final    Sodium 11/07/2023 139  136 " - 145 mmol/L Final    Potassium 11/07/2023 4.4  3.5 - 5.3 mmol/L Final    Chloride 11/07/2023 102  98 - 107 mmol/L Final    Bicarbonate 11/07/2023 31  21 - 32 mmol/L Final    Anion Gap 11/07/2023 10  10 - 20 mmol/L Final    Urea Nitrogen 11/07/2023 15  6 - 23 mg/dL Final    Creatinine 11/07/2023 0.77  0.50 - 1.30 mg/dL Final    eGFR 11/07/2023 >90  >60 mL/min/1.73m*2 Final    Calculations of estimated GFR are performed using the 2021 CKD-EPI Study Refit equation without the race variable for the IDMS-Traceable creatinine methods.  https://jasn.asnjournals.org/content/early/2021/09/22/ASN.9912755645    Calcium 11/07/2023 9.0  8.6 - 10.3 mg/dL Final    Albumin 11/07/2023 3.9  3.4 - 5.0 g/dL Final    Alkaline Phosphatase 11/07/2023 37  33 - 136 U/L Final    Total Protein 11/07/2023 6.7  6.4 - 8.2 g/dL Final    AST 11/07/2023 18  9 - 39 U/L Final    Bilirubin, Total 11/07/2023 0.6  0.0 - 1.2 mg/dL Final    ALT 11/07/2023 17  10 - 52 U/L Final    Patients treated with Sulfasalazine may generate falsely decreased results for ALT.    Hemoglobin A1C 11/07/2023 8.2 (H)  see below % Final    Estimated Average Glucose 11/07/2023 189  Not Established mg/dL Final    Cholesterol 11/07/2023 98  0 - 199 mg/dL Final          Age      Desirable   Borderline High   High     0-19 Y     0 - 169       170 - 199     >/= 200    20-24 Y     0 - 189       190 - 224     >/= 225         >24 Y     0 - 199       200 - 239     >/= 240   **All ranges are based on fasting samples. Specific   therapeutic targets will vary based on patient-specific   cardiac risk.    Pediatric guidelines reference:Pediatrics 2011, 128(S5).Adult guidelines reference: NCEP ATPIII Guidelines,VLAD 2001, 258:2486-97    Venipuncture immediately after or during the administration of Metamizole may lead to falsely low results. Testing should be performed immediately prior to Metamizole dosing.    HDL-Cholesterol 11/07/2023 38.9  mg/dL Final      Age       Very Low   Low      Normal    High    0-19 Y    < 35      < 40     40-45     ----  20-24 Y    ----     < 40      >45      ----        >24 Y      ----     < 40     40-60      >60      Cholesterol/HDL Ratio 11/07/2023 2.5   Final      Ref Values  Desirable  < 3.4  High Risk  > 5.0    LDL Calculated 11/07/2023 38  <=99 mg/dL Final                                Near   Borderline      AGE      Desirable  Optimal    High     High     Very High     0-19 Y     0 - 109     ---    110-129   >/= 130     ----    20-24 Y     0 - 119     ---    120-159   >/= 160     ----      >24 Y     0 -  99   100-129  130-159   160-189     >/=190      VLDL 11/07/2023 21  0 - 40 mg/dL Final    Triglycerides 11/07/2023 104  0 - 149 mg/dL Final       Age         Desirable   Borderline High   High     Very High   0 D-90 D    19 - 174         ----         ----        ----  91 D- 9 Y     0 -  74        75 -  99     >/= 100      ----    10-19 Y     0 -  89        90 - 129     >/= 130      ----    20-24 Y     0 - 114       115 - 149     >/= 150      ----         >24 Y     0 - 149       150 - 199    200- 499    >/= 500    Venipuncture immediately after or during the administration of Metamizole may lead to falsely low results. Testing should be performed immediately prior to Metamizole dosing.    Non HDL Cholesterol 11/07/2023 59  0 - 149 mg/dL Final          Age       Desirable   Borderline High   High     Very High     0-19 Y     0 - 119       120 - 144     >/= 145    >/= 160    20-24 Y     0 - 149       150 - 189     >/= 190      ----         >24 Y    30 mg/dL above LDL Cholesterol goal      Prostate Specific AG 11/07/2023 0.67  <=4.00 ng/mL Final       Assessment/Plan   Problem List Items Addressed This Visit    None  Visit Diagnoses         Codes    Controlled type 2 diabetes mellitus with hyperglycemia, with long-term current use of insulin (CMS/[facility])    -  Primary E11.65, Z79.4    Relevant Orders    Comprehensive metabolic panel    Hemoglobin A1c    Lipid panel     Comprehensive metabolic panel    Lipid panel    Hemoglobin A1c    Hyperlipidemia, mixed     E78.2    Relevant Medications    omega-3 acid ethyl esters (Lovaza) 1 gram capsule    Other Relevant Orders    Comprehensive metabolic panel    Hemoglobin A1c    Lipid panel    Comprehensive metabolic panel    Lipid panel    Hemoglobin A1c          Doing well.  Refilled meds.  Follow up in 3 mo.  Switching to Mounjaro.  Call if BS running high off basal insulin.

## 2024-05-05 DIAGNOSIS — E11.9 TYPE 2 DIABETES MELLITUS WITHOUT COMPLICATION, WITH LONG-TERM CURRENT USE OF INSULIN (MULTI): ICD-10-CM

## 2024-05-05 DIAGNOSIS — Z79.4 TYPE 2 DIABETES MELLITUS WITHOUT COMPLICATION, WITH LONG-TERM CURRENT USE OF INSULIN (MULTI): ICD-10-CM

## 2024-05-06 RX ORDER — (INSULIN DEGLUDEC AND LIRAGLUTIDE) 100; 3.6 [IU]/ML; MG/ML
INJECTION, SOLUTION SUBCUTANEOUS
Qty: 45 ML | Refills: 3 | Status: SHIPPED | OUTPATIENT
Start: 2024-05-06

## 2024-05-08 ENCOUNTER — OFFICE VISIT (OUTPATIENT)
Dept: PRIMARY CARE | Facility: CLINIC | Age: 69
End: 2024-05-08
Payer: COMMERCIAL

## 2024-05-08 ENCOUNTER — LAB (OUTPATIENT)
Dept: LAB | Facility: LAB | Age: 69
End: 2024-05-08
Payer: COMMERCIAL

## 2024-05-08 VITALS
BODY MASS INDEX: 30.92 KG/M2 | DIASTOLIC BLOOD PRESSURE: 70 MMHG | WEIGHT: 254 LBS | TEMPERATURE: 97 F | HEART RATE: 65 BPM | SYSTOLIC BLOOD PRESSURE: 126 MMHG | OXYGEN SATURATION: 98 %

## 2024-05-08 DIAGNOSIS — E78.2 MIXED HYPERLIPIDEMIA: ICD-10-CM

## 2024-05-08 DIAGNOSIS — Z79.4 CONTROLLED TYPE 2 DIABETES MELLITUS WITH HYPERGLYCEMIA, WITH LONG-TERM CURRENT USE OF INSULIN (MULTI): ICD-10-CM

## 2024-05-08 DIAGNOSIS — I48.0 PAROXYSMAL ATRIAL FIBRILLATION (MULTI): ICD-10-CM

## 2024-05-08 DIAGNOSIS — I10 PRIMARY HYPERTENSION: ICD-10-CM

## 2024-05-08 DIAGNOSIS — G62.9 NEUROPATHY: ICD-10-CM

## 2024-05-08 DIAGNOSIS — I50.22 CHRONIC SYSTOLIC HEART FAILURE (MULTI): ICD-10-CM

## 2024-05-08 DIAGNOSIS — I10 ESSENTIAL HYPERTENSION: Primary | ICD-10-CM

## 2024-05-08 DIAGNOSIS — E11.65 CONTROLLED TYPE 2 DIABETES MELLITUS WITH HYPERGLYCEMIA, WITH LONG-TERM CURRENT USE OF INSULIN (MULTI): ICD-10-CM

## 2024-05-08 DIAGNOSIS — M79.604 PAIN IN BOTH LOWER EXTREMITIES: ICD-10-CM

## 2024-05-08 DIAGNOSIS — E78.2 HYPERLIPIDEMIA, MIXED: ICD-10-CM

## 2024-05-08 DIAGNOSIS — M79.605 PAIN IN BOTH LOWER EXTREMITIES: ICD-10-CM

## 2024-05-08 LAB
ALBUMIN SERPL BCP-MCNC: 4 G/DL (ref 3.4–5)
ALP SERPL-CCNC: 31 U/L (ref 33–136)
ALT SERPL W P-5'-P-CCNC: 17 U/L (ref 10–52)
ANION GAP SERPL CALC-SCNC: 11 MMOL/L (ref 10–20)
AST SERPL W P-5'-P-CCNC: 19 U/L (ref 9–39)
BILIRUB SERPL-MCNC: 0.7 MG/DL (ref 0–1.2)
BUN SERPL-MCNC: 14 MG/DL (ref 6–23)
CALCIUM SERPL-MCNC: 9.5 MG/DL (ref 8.6–10.3)
CHLORIDE SERPL-SCNC: 100 MMOL/L (ref 98–107)
CHOLEST SERPL-MCNC: 75 MG/DL (ref 0–199)
CHOLESTEROL/HDL RATIO: 1.7
CO2 SERPL-SCNC: 32 MMOL/L (ref 21–32)
CREAT SERPL-MCNC: 0.86 MG/DL (ref 0.5–1.3)
EGFRCR SERPLBLD CKD-EPI 2021: >90 ML/MIN/1.73M*2
GLUCOSE SERPL-MCNC: 87 MG/DL (ref 74–99)
HDLC SERPL-MCNC: 44.1 MG/DL
LDLC SERPL CALC-MCNC: 20 MG/DL
NON HDL CHOLESTEROL: 31 MG/DL (ref 0–149)
POTASSIUM SERPL-SCNC: 4.6 MMOL/L (ref 3.5–5.3)
PROT SERPL-MCNC: 6.8 G/DL (ref 6.4–8.2)
SODIUM SERPL-SCNC: 138 MMOL/L (ref 136–145)
TRIGL SERPL-MCNC: 56 MG/DL (ref 0–149)
VLDL: 11 MG/DL (ref 0–40)

## 2024-05-08 PROCEDURE — 3078F DIAST BP <80 MM HG: CPT | Performed by: FAMILY MEDICINE

## 2024-05-08 PROCEDURE — 3074F SYST BP LT 130 MM HG: CPT | Performed by: FAMILY MEDICINE

## 2024-05-08 PROCEDURE — 36415 COLL VENOUS BLD VENIPUNCTURE: CPT

## 2024-05-08 PROCEDURE — 4010F ACE/ARB THERAPY RXD/TAKEN: CPT | Performed by: FAMILY MEDICINE

## 2024-05-08 PROCEDURE — 3046F HEMOGLOBIN A1C LEVEL >9.0%: CPT | Performed by: FAMILY MEDICINE

## 2024-05-08 PROCEDURE — 80053 COMPREHEN METABOLIC PANEL: CPT

## 2024-05-08 PROCEDURE — 1036F TOBACCO NON-USER: CPT | Performed by: FAMILY MEDICINE

## 2024-05-08 PROCEDURE — 80061 LIPID PANEL: CPT

## 2024-05-08 PROCEDURE — 83036 HEMOGLOBIN GLYCOSYLATED A1C: CPT

## 2024-05-08 PROCEDURE — 1159F MED LIST DOCD IN RCRD: CPT | Performed by: FAMILY MEDICINE

## 2024-05-08 PROCEDURE — 99214 OFFICE O/P EST MOD 30 MIN: CPT | Performed by: FAMILY MEDICINE

## 2024-05-08 PROCEDURE — 3048F LDL-C <100 MG/DL: CPT | Performed by: FAMILY MEDICINE

## 2024-05-08 RX ORDER — RIVAROXABAN 20 MG/1
20 TABLET, FILM COATED ORAL
Qty: 90 TABLET | Refills: 3 | Status: SHIPPED | OUTPATIENT
Start: 2024-05-08 | End: 2025-05-08

## 2024-05-08 RX ORDER — TIRZEPATIDE 5 MG/.5ML
5 INJECTION, SOLUTION SUBCUTANEOUS
Qty: 2 ML | Refills: 0 | Status: SHIPPED | OUTPATIENT
Start: 2024-05-12 | End: 2024-05-08 | Stop reason: SDUPTHER

## 2024-05-08 RX ORDER — ATORVASTATIN CALCIUM 20 MG/1
20 TABLET, FILM COATED ORAL DAILY
Qty: 90 TABLET | Refills: 3 | Status: SHIPPED | OUTPATIENT
Start: 2024-05-08 | End: 2025-05-08

## 2024-05-08 RX ORDER — TIRZEPATIDE 5 MG/.5ML
5 INJECTION, SOLUTION SUBCUTANEOUS
Qty: 2 ML | Refills: 0 | Status: SHIPPED | OUTPATIENT
Start: 2024-05-12

## 2024-05-08 RX ORDER — LISINOPRIL 40 MG/1
40 TABLET ORAL DAILY
Qty: 90 TABLET | Refills: 3 | Status: SHIPPED | OUTPATIENT
Start: 2024-05-08 | End: 2025-05-08

## 2024-05-08 RX ORDER — GABAPENTIN 400 MG/1
400 CAPSULE ORAL 3 TIMES DAILY
Qty: 270 CAPSULE | Refills: 1 | Status: SHIPPED | OUTPATIENT
Start: 2024-05-08 | End: 2024-11-04

## 2024-05-08 RX ORDER — HYDROCHLOROTHIAZIDE 12.5 MG/1
12.5 TABLET ORAL DAILY
Qty: 90 TABLET | Refills: 3 | Status: SHIPPED | OUTPATIENT
Start: 2024-05-08 | End: 2025-05-08

## 2024-05-08 RX ORDER — METFORMIN HYDROCHLORIDE 1000 MG/1
1000 TABLET ORAL 2 TIMES DAILY
Qty: 180 TABLET | Refills: 3 | Status: SHIPPED | OUTPATIENT
Start: 2024-05-08

## 2024-05-08 RX ORDER — METOPROLOL SUCCINATE 25 MG/1
25 TABLET, EXTENDED RELEASE ORAL DAILY
Qty: 90 TABLET | Refills: 3 | Status: SHIPPED | OUTPATIENT
Start: 2024-05-08 | End: 2025-05-08

## 2024-05-08 RX ORDER — TIRZEPATIDE 7.5 MG/.5ML
7.5 INJECTION, SOLUTION SUBCUTANEOUS
Qty: 2 ML | Refills: 2 | Status: SHIPPED | OUTPATIENT
Start: 2024-06-12

## 2024-05-08 ASSESSMENT — PATIENT HEALTH QUESTIONNAIRE - PHQ9
1. LITTLE INTEREST OR PLEASURE IN DOING THINGS: NOT AT ALL
SUM OF ALL RESPONSES TO PHQ9 QUESTIONS 1 AND 2: 0
2. FEELING DOWN, DEPRESSED OR HOPELESS: NOT AT ALL

## 2024-05-08 NOTE — PROGRESS NOTES
Subjective   Patient ID: Lux Camejo is a 69 y.o. male who presents for GERD and Diabetes (recheck).  Diabetes      HPI: Diabetes, hypertension and increased cholesterol.       All  Diabetes: No medication side effects noted. Trying to follow recommended diet. Patient is exercising. Hypoglycemic Episodes: None.    End All         Lipids well controlled last check, checking today.        Afib: stable.      DM2 is pending.  Somehow started on Mounjaro at 12.5 mg which has been tough for him in terms of nausea and having more muscle pain.  Has lost weight compared to before.      Patient Active Problem List   Diagnosis    Paroxysmal atrial fibrillation (Multi)    Gastroesophageal reflux disease    Chronic systolic heart failure (Multi)    COPD (chronic obstructive pulmonary disease) (Multi)    Diabetes mellitus (Multi)    Hyperlipidemia    Essential hypertension    Obesity (BMI 30-39.9)    Obstructive sleep apnea    Seasonal allergies    Type 2 diabetes mellitus with diabetic neuropathy, unspecified (Multi)       Social Connections: Not on file       Current Outpatient Medications on File Prior to Visit   Medication Sig Dispense Refill    amLODIPine (Norvasc) 5 mg tablet Take 1 tablet (5 mg) by mouth once daily. 90 tablet 3    aspirin 81 mg chewable tablet Chew 1 tablet (81 mg) once daily.      atorvastatin (Lipitor) 20 mg tablet Take 1 tablet (20 mg) by mouth once daily. 90 tablet 3    Dexcom G7  misc Use as instructed 1 each 0    Dexcom G7 Sensor device To be switched every 10 days 8 each 3    fluticasone (Flonase) 50 mcg/actuation nasal spray Administer 2 sprays into each nostril once daily. 48 g 3    FreeStyle Andrés 3 Sensor device To be used q2 weeks 6 each 3    hydroCHLOROthiazide (HYDRODiuril) 12.5 mg tablet Take 1 tablet (12.5 mg) by mouth once daily. 90 tablet 3    lisinopril 40 mg tablet Take 1 tablet (40 mg) by mouth once daily. 90 tablet 3    metFORMIN (Glucophage) 1,000 mg tablet TAKE ONE TABLET  "BY MOUTH TWICE A  tablet 3    metoprolol succinate XL (Toprol-XL) 25 mg 24 hr tablet Take 1 tablet (25 mg) by mouth once daily. 90 tablet 3    NovoLOG Flexpen U-100 Insulin 100 unit/mL (3 mL) pen INJECT 15 UNITS UNDER THE SKIN BEFORE EACH MEAL THREE TIMES A DAY 39 mL 11    omega-3 acid ethyl esters (Lovaza) 1 gram capsule Take 1 capsule (1 g) by mouth once daily. 90 capsule 3    OneTouch Delica Plus Lancet 30 gauge misc       OneTouch Ultra Test strip       pen needle, diabetic 32 gauge x 5/32\" needle To be used 4x a day 400 each 3    tirzepatide (Mounjaro) 12.5 mg/0.5 mL pen injector Inject 12.5 mg under the skin 1 (one) time per week. 6 mL 1    Xarelto 20 mg tablet Take 1 tablet (20 mg) by mouth once daily in the evening. Take with meals. 90 tablet 3    Xultophy 100/3.6 100 unit-3.6 mg /mL (3 mL) insulin pen STARTING WITH INJECTING 40 UNITS UNDER THE SKIN DAILY AND INCREASE BY 2 UNITS A DAY UNTIL FASTING BLOOD SUGARS ARE LESS THAN 140 45 mL 3    gabapentin (Neurontin) 400 mg capsule Take 1 capsule (400 mg) by mouth 3 times a day. 270 capsule 1     No current facility-administered medications on file prior to visit.        Vitals:    05/08/24 0810   BP: 126/70   Pulse: 65   Temp: 36.1 °C (97 °F)   SpO2: 98%     Vitals:    05/08/24 0810   Weight: 115 kg (254 lb)       Review of Systems   All other systems reviewed and are negative.      Objective     Physical Exam  Constitutional:       Appearance: Normal appearance. He is well-developed.   HENT:      Head: Atraumatic.   Cardiovascular:      Rate and Rhythm: Normal rate and regular rhythm.      Heart sounds: Normal heart sounds. No murmur heard.  Pulmonary:      Effort: Pulmonary effort is normal.      Breath sounds: Normal breath sounds.   Abdominal:      General: Bowel sounds are normal.      Palpations: Abdomen is soft.   Feet:      Right foot:      Skin integrity: Skin integrity normal.      Left foot:      Skin integrity: Skin integrity normal.   Skin:   "   General: Skin is warm.   Neurological:      General: No focal deficit present.      Mental Status: He is alert.   Psychiatric:         Mood and Affect: Mood normal.     Decreased sensation    No visits with results within 2 Month(s) from this visit.   Latest known visit with results is:   Lab on 02/07/2024   Component Date Value Ref Range Status    Glucose 02/07/2024 167 (H)  74 - 99 mg/dL Final    Sodium 02/07/2024 138  136 - 145 mmol/L Final    Potassium 02/07/2024 4.3  3.5 - 5.3 mmol/L Final    Chloride 02/07/2024 100  98 - 107 mmol/L Final    Bicarbonate 02/07/2024 33 (H)  21 - 32 mmol/L Final    Anion Gap 02/07/2024 9 (L)  10 - 20 mmol/L Final    Urea Nitrogen 02/07/2024 13  6 - 23 mg/dL Final    Creatinine 02/07/2024 0.81  0.50 - 1.30 mg/dL Final    eGFR 02/07/2024 >90  >60 mL/min/1.73m*2 Final    Calculations of estimated GFR are performed using the 2021 CKD-EPI Study Refit equation without the race variable for the IDMS-Traceable creatinine methods.  https://jasn.asnjournals.org/content/early/2021/09/22/ASN.4324901896    Calcium 02/07/2024 9.3  8.6 - 10.3 mg/dL Final    Albumin 02/07/2024 4.1  3.4 - 5.0 g/dL Final    Alkaline Phosphatase 02/07/2024 30 (L)  33 - 136 U/L Final    Total Protein 02/07/2024 7.1  6.4 - 8.2 g/dL Final    AST 02/07/2024 17  9 - 39 U/L Final    Bilirubin, Total 02/07/2024 0.6  0.0 - 1.2 mg/dL Final    ALT 02/07/2024 17  10 - 52 U/L Final    Patients treated with Sulfasalazine may generate falsely decreased results for ALT.    Hemoglobin A1C 02/07/2024 9.1 (H)  see below % Final    Estimated Average Glucose 02/07/2024 214  Not Established mg/dL Final    Cholesterol 02/07/2024 87  0 - 199 mg/dL Final          Age      Desirable   Borderline High   High     0-19 Y     0 - 169       170 - 199     >/= 200    20-24 Y     0 - 189       190 - 224     >/= 225         >24 Y     0 - 199       200 - 239     >/= 240   **All ranges are based on fasting samples. Specific   therapeutic targets  will vary based on patient-specific   cardiac risk.    Pediatric guidelines reference:Pediatrics 2011, 128(S5).Adult guidelines reference: NCEP ATPIII Guidelines,VLAD 2001, 258:2486-97    Venipuncture immediately after or during the administration of Metamizole may lead to falsely low results. Testing should be performed immediately prior to Metamizole dosing.    HDL-Cholesterol 02/07/2024 41.1  mg/dL Final      Age       Very Low   Low     Normal    High    0-19 Y    < 35      < 40     40-45     ----  20-24 Y    ----     < 40      >45      ----        >24 Y      ----     < 40     40-60      >60      Cholesterol/HDL Ratio 02/07/2024 2.1   Final      Ref Values  Desirable  < 3.4  High Risk  > 5.0    LDL Calculated 02/07/2024 30  <=99 mg/dL Final                                Near   Borderline      AGE      Desirable  Optimal    High     High     Very High     0-19 Y     0 - 109     ---    110-129   >/= 130     ----    20-24 Y     0 - 119     ---    120-159   >/= 160     ----      >24 Y     0 -  99   100-129  130-159   160-189     >/=190      VLDL 02/07/2024 16  0 - 40 mg/dL Final    Triglycerides 02/07/2024 78  0 - 149 mg/dL Final       Age         Desirable   Borderline High   High     Very High   0 D-90 D    19 - 174         ----         ----        ----  91 D- 9 Y     0 -  74        75 -  99     >/= 100      ----    10-19 Y     0 -  89        90 - 129     >/= 130      ----    20-24 Y     0 - 114       115 - 149     >/= 150      ----         >24 Y     0 - 149       150 - 199    200- 499    >/= 500    Venipuncture immediately after or during the administration of Metamizole may lead to falsely low results. Testing should be performed immediately prior to Metamizole dosing.    Non HDL Cholesterol 02/07/2024 46  0 - 149 mg/dL Final          Age       Desirable   Borderline High   High     Very High     0-19 Y     0 - 119       120 - 144     >/= 145    >/= 160    20-24 Y     0 - 149       150 - 189     >/= 190       ----         >24 Y    30 mg/dL above LDL Cholesterol goal      Prostate Specific AG 02/07/2024 0.52  <=4.00 ng/mL Final       Assessment/Plan   Problem List Items Addressed This Visit             ICD-10-CM    Paroxysmal atrial fibrillation (Multi) I48.0    Chronic systolic heart failure (Multi) I50.22    Hyperlipidemia E78.5    Essential hypertension - Primary I10     Other Visit Diagnoses         Codes    Neuropathy     G62.9    Pain in both lower extremities     M79.604, M79.605    Controlled type 2 diabetes mellitus with hyperglycemia, with long-term current use of insulin (Multi)     E11.65, Z79.4    Primary hypertension     I10          Doing well.  Refilled meds.  Follow up in 3 mo.  Go to a lower dose of Mounjaro.  Checking BW.   Encouraged exercise.

## 2024-05-09 ENCOUNTER — TELEPHONE (OUTPATIENT)
Dept: PRIMARY CARE | Facility: CLINIC | Age: 69
End: 2024-05-09
Payer: COMMERCIAL

## 2024-05-09 LAB
EST. AVERAGE GLUCOSE BLD GHB EST-MCNC: 151 MG/DL
HBA1C MFR BLD: 6.9 %

## 2024-05-09 NOTE — TELEPHONE ENCOUNTER
----- Message from Jason De Anda MD sent at 5/9/2024  9:48 AM EDT -----  Pts A1c was 6.9 which is excellent for him.  Keep up the good work.

## 2024-05-21 ENCOUNTER — APPOINTMENT (OUTPATIENT)
Dept: CARDIOLOGY | Facility: CLINIC | Age: 69
End: 2024-05-21
Payer: COMMERCIAL

## 2024-05-28 ENCOUNTER — APPOINTMENT (OUTPATIENT)
Dept: CARDIOLOGY | Facility: CLINIC | Age: 69
End: 2024-05-28
Payer: COMMERCIAL

## 2024-06-04 DIAGNOSIS — I10 PRIMARY HYPERTENSION: ICD-10-CM

## 2024-06-05 RX ORDER — METOPROLOL SUCCINATE 25 MG/1
25 TABLET, EXTENDED RELEASE ORAL DAILY
Qty: 90 TABLET | Refills: 1 | OUTPATIENT
Start: 2024-06-05

## 2024-06-12 DIAGNOSIS — Z79.4 CONTROLLED TYPE 2 DIABETES MELLITUS WITH HYPERGLYCEMIA, WITH LONG-TERM CURRENT USE OF INSULIN (MULTI): ICD-10-CM

## 2024-06-12 DIAGNOSIS — E11.65 CONTROLLED TYPE 2 DIABETES MELLITUS WITH HYPERGLYCEMIA, WITH LONG-TERM CURRENT USE OF INSULIN (MULTI): ICD-10-CM

## 2024-06-13 RX ORDER — TIRZEPATIDE 5 MG/.5ML
INJECTION, SOLUTION SUBCUTANEOUS
Qty: 2 ML | Refills: 0 | OUTPATIENT
Start: 2024-06-13

## 2024-06-23 NOTE — PROGRESS NOTES
Chief Complaint/Reason for Visit:   Patient is coming in today as a 6 month Cardiovascular follow up.     History Of Present Illness:    Mr. Camejo is coming in today as a 6-month cardiovascular follow-up.  We have followed this patient previously for hypertension, dyslipidemia, atrial fibrillation, abnormal coronary calcium score, and mild LV DD (EF 45%).    Patient comes in today telling me that his wife passed away approximately 4 months ago.  He has been trying to take care of himself as best as possible.  He has noted some increased fatigue as well as dyspnea on exertion which is worsened over the past 6 months.  He is taking his medication as prescribed.  He has had no recent hospitalizations or emergency room visits.    Past Medical History:  He has a past medical history of Personal history of other diseases of the respiratory system and Personal history of pulmonary embolism.    Past Surgical History:  He has a past surgical history that includes Other surgical history (08/31/2020) and Other surgical history (10/14/2020).      Social History:  He reports that he quit smoking about 18 years ago. His smoking use included cigarettes and cigars. He has never used smokeless tobacco. He reports that he does not currently use alcohol. He reports that he does not use drugs.    Family History:  Family History   Problem Relation Name Age of Onset    Cancer Mother      Heart attack Father          Allergies:  Patient has no known allergies.    Medications:  Current Outpatient Medications   Medication Instructions    amLODIPine (NORVASC) 5 mg, oral, Daily    aspirin 81 mg, oral, Daily    atorvastatin (LIPITOR) 20 mg, oral, Daily    Dexcom G7  misc Use as instructed    Dexcom G7 Sensor device To be switched every 10 days    fluticasone (Flonase) 50 mcg/actuation nasal spray 2 sprays, Each Nostril, Daily    FreeStyle Andrés 3 Sensor device To be used q2 weeks    gabapentin (NEURONTIN) 400 mg, oral, 3 times daily     "hydroCHLOROthiazide (MICROZIDE) 12.5 mg, oral, Daily    lisinopril 40 mg, oral, Daily    metFORMIN (GLUCOPHAGE) 1,000 mg, oral, 2 times daily    metoprolol succinate XL (TOPROL-XL) 25 mg, oral, Daily    Mounjaro 12.5 mg, subcutaneous, Once Weekly    Mounjaro 7.5 mg, subcutaneous, Once Weekly    Mounjaro 5 mg, subcutaneous, Once Weekly    NovoLOG Flexpen U-100 Insulin 100 unit/mL (3 mL) pen INJECT 15 UNITS UNDER THE SKIN BEFORE EACH MEAL THREE TIMES A DAY    omega-3 acid ethyl esters (LOVAZA) 1 g, oral, Daily    OneTouch Delica Plus Lancet 30 gauge misc     OneTouch Ultra Test strip     pen needle, diabetic 32 gauge x 5/32\" needle To be used 4x a day    Xarelto 20 mg, oral, Daily with evening meal    Xultophy 100/3.6 100 unit-3.6 mg /mL (3 mL) insulin pen STARTING WITH INJECTING 40 UNITS UNDER THE SKIN DAILY AND INCREASE BY 2 UNITS A DAY UNTIL FASTING BLOOD SUGARS ARE LESS THAN 140       Review of Systems:  Review of Systems   Constitutional: Positive for malaise/fatigue. Negative for decreased appetite.   HENT: Negative.     Eyes:  Negative for blurred vision and visual disturbance.   Cardiovascular:  Positive for dyspnea on exertion (with 2 flights of stairs) and palpitations. Negative for chest pain, irregular heartbeat, leg swelling, orthopnea and syncope.        Dyspnea on exertion has worsened over the past 6 months.   Respiratory: Negative.  Negative for cough and shortness of breath.    Musculoskeletal:  Negative for arthritis and falls.   Gastrointestinal: Negative.    Neurological:  Negative for focal weakness and light-headedness.   Psychiatric/Behavioral:  Negative for depression and memory loss.         Vitals  Visit Vitals  /56 (BP Location: Left arm, Patient Position: Sitting, BP Cuff Size: Adult)   Pulse 54   Ht 1.93 m (6' 4\")   Wt 112 kg (247 lb)   SpO2 100%   BMI 30.07 kg/m²   Smoking Status Former   BSA 2.45 m²        Physical Exam:  Physical Exam  Constitutional:       Appearance: Normal " appearance.   HENT:      Head: Normocephalic.   Eyes:      Conjunctiva/sclera: Conjunctivae normal.   Cardiovascular:      Rate and Rhythm: Bradycardia present. Rhythm irregular.      Pulses: Normal pulses.      Heart sounds: S1 normal and S2 normal. No murmur heard.     No friction rub. No gallop.      Comments: Extra systole noted  Pulmonary:      Effort: Pulmonary effort is normal.      Breath sounds: Normal breath sounds.   Abdominal:      General: Bowel sounds are normal.      Palpations: Abdomen is soft.      Tenderness: There is no abdominal tenderness.   Musculoskeletal:      Cervical back: Neck supple.      Right lower leg: No edema.      Left lower leg: No edema.   Skin:     General: Skin is warm and dry.   Neurological:      General: No focal deficit present.      Mental Status: He is alert and oriented to person, place, and time.   Psychiatric:         Attention and Perception: Attention normal.         Mood and Affect: Mood normal.           Last Labs:  CBC -  Lab Results   Component Value Date    WBC 8.2 03/22/2022    HGB 14.7 03/22/2022    HCT 46.6 03/22/2022    MCV 96 03/22/2022     03/22/2022     Lab Results   Component Value Date    GLUCOSE 87 05/08/2024    CALCIUM 9.5 05/08/2024     05/08/2024    K 4.6 05/08/2024    CO2 32 05/08/2024     05/08/2024    BUN 14 05/08/2024    CREATININE 0.86 05/08/2024      CMP -  Lab Results   Component Value Date    CALCIUM 9.5 05/08/2024    PROT 6.8 05/08/2024    ALBUMIN 4.0 05/08/2024    AST 19 05/08/2024    ALT 17 05/08/2024    ALKPHOS 31 (L) 05/08/2024    BILITOT 0.7 05/08/2024       LIPID PANEL -   Lab Results   Component Value Date    CHOL 75 05/08/2024    TRIG 56 05/08/2024    HDL 44.1 05/08/2024    CHHDL 1.7 05/08/2024    LDLF 30 02/28/2023    VLDL 11 05/08/2024    NHDL 31 05/08/2024       Lab Results   Component Value Date    HGBA1C 6.9 (H) 05/08/2024       Last Cardiology Tests:    Echo:9-22-20  CONCLUSIONS:   1. The left ventricular  systolic function is mildly to moderately decreased with a 45% estimated ejection fraction.   2. The left atrium is mild to moderately dilated.    Stress Test:10-5-22  Summary:   1. No clinical or electrocardiographic evidence for ischemia at maximal workload.   2. Nuclear image results are reported separately.   3. The adequate level of stress was achieved.  IMPRESSION:  1. There is a moderate-sized partially reversible perfusion defect in  the inferior and apical walls suggesting prior infarct with a small  area of dwayne-infarct ischemia. No prone imaging available.     2. Calculated ejection fraction of 55% with mild hypokinesis of the  inferior wall.    Lab review: I have personally reviewed the laboratory result(s)     Assessment/Plan:  Chronic atrial fibrillation: Patient on exam is bradycardic and irregular.  He is asymptomatic.  Patient has an elevated [id number] score requiring anticoagulation.  Patient currently is on metoprolol succinate 25 mg daily and Xarelto 20 mg daily.  Patient is not having any abnormal bleeding or bruising and should continue on anticoagulation as well as rate control.    Hypertension: Blood pressure today shows excellent control at 114/56.  Patient will continue on amlodipine 5 mg daily, hydrochlorothiazide 12.5 mg daily, lisinopril 40 mg daily, metoprolol succinate 25 mg daily.  I encouraged him to be on a low-sodium diet.    Chronic systolic heart failure: Patient's echocardiogram in 2020 showed an ejection fraction of 45%.  He has been on a heart failure approved beta-blocker, lisinopril, and hydrochlorothiazide.  Patient has been having slight worsening of his dyspnea on exertion and has been more fatigued.  I am ordering an updated echocardiogram to reassess his LV function.  On exam, he does not show signs of volume overload.  I will call the patient with the results once available.    Dyslipidemia: Lipid labs from May show triglycerides 56, LDL 30.  Patient will continue  on atorvastatin 20 mg nightly.  I encouraged her to be on a heart healthy diet.    Depression: We discussed depression and grieving with the loss of his wife approximately 4 months ago.  Patient seems to be doing reasonably well.  I encouraged him to talk with family and possibly his PCP if he does not see improvement over time.    Patient will get an updated echocardiogram.  He will be scheduled to follow-up with Dr. Walker in 6 months.  Patient instructed to call with any cardiovascular complaints. All questions were answered.       Dragon dictation was utilized to create this document. Quite often unanticipated grammatical, syntax,  and other interpretive errors are inadvertently transcribed by the computer software.  Please disregard these errors.  Please excuse any errors that have escaped final proofreading.             Nayeli Samson, APRN-CNP

## 2024-06-25 ENCOUNTER — APPOINTMENT (OUTPATIENT)
Dept: CARDIOLOGY | Facility: CLINIC | Age: 69
End: 2024-06-25
Payer: COMMERCIAL

## 2024-06-25 VITALS
HEIGHT: 76 IN | SYSTOLIC BLOOD PRESSURE: 114 MMHG | WEIGHT: 247 LBS | DIASTOLIC BLOOD PRESSURE: 56 MMHG | OXYGEN SATURATION: 100 % | HEART RATE: 54 BPM | BODY MASS INDEX: 30.08 KG/M2

## 2024-06-25 DIAGNOSIS — I48.0 PAROXYSMAL ATRIAL FIBRILLATION (MULTI): Primary | ICD-10-CM

## 2024-06-25 DIAGNOSIS — R06.09 DOE (DYSPNEA ON EXERTION): ICD-10-CM

## 2024-06-25 DIAGNOSIS — I10 ESSENTIAL HYPERTENSION: ICD-10-CM

## 2024-06-25 DIAGNOSIS — I50.22 CHRONIC SYSTOLIC HEART FAILURE (MULTI): ICD-10-CM

## 2024-06-25 DIAGNOSIS — F32.A DEPRESSION, UNSPECIFIED DEPRESSION TYPE: ICD-10-CM

## 2024-06-25 DIAGNOSIS — E78.5 DYSLIPIDEMIA: ICD-10-CM

## 2024-06-25 PROCEDURE — 4010F ACE/ARB THERAPY RXD/TAKEN: CPT | Performed by: NURSE PRACTITIONER

## 2024-06-25 PROCEDURE — 1159F MED LIST DOCD IN RCRD: CPT | Performed by: NURSE PRACTITIONER

## 2024-06-25 PROCEDURE — 99214 OFFICE O/P EST MOD 30 MIN: CPT | Performed by: NURSE PRACTITIONER

## 2024-06-25 PROCEDURE — 1160F RVW MEDS BY RX/DR IN RCRD: CPT | Performed by: NURSE PRACTITIONER

## 2024-06-25 PROCEDURE — 3078F DIAST BP <80 MM HG: CPT | Performed by: NURSE PRACTITIONER

## 2024-06-25 PROCEDURE — 3044F HG A1C LEVEL LT 7.0%: CPT | Performed by: NURSE PRACTITIONER

## 2024-06-25 PROCEDURE — 1036F TOBACCO NON-USER: CPT | Performed by: NURSE PRACTITIONER

## 2024-06-25 PROCEDURE — 3048F LDL-C <100 MG/DL: CPT | Performed by: NURSE PRACTITIONER

## 2024-06-25 PROCEDURE — 3074F SYST BP LT 130 MM HG: CPT | Performed by: NURSE PRACTITIONER

## 2024-06-25 ASSESSMENT — ENCOUNTER SYMPTOMS
ORTHOPNEA: 0
IRREGULAR HEARTBEAT: 0
SYNCOPE: 0
FOCAL WEAKNESS: 0
PALPITATIONS: 1
GASTROINTESTINAL NEGATIVE: 1
DECREASED APPETITE: 0
RESPIRATORY NEGATIVE: 1
SHORTNESS OF BREATH: 0
DEPRESSION: 0
FALLS: 0
BLURRED VISION: 0
LIGHT-HEADEDNESS: 0
DYSPNEA ON EXERTION: 1
MEMORY LOSS: 0
COUGH: 0

## 2024-06-25 NOTE — PATIENT INSTRUCTIONS
Continue on current meds  Heart healthy, low sodium diet  Mediterranean diet is recommended  An updated Echo is ordered  Follow up with Dr Walker in 6 months

## 2024-07-07 DIAGNOSIS — E11.65 CONTROLLED TYPE 2 DIABETES MELLITUS WITH HYPERGLYCEMIA, WITH LONG-TERM CURRENT USE OF INSULIN (MULTI): ICD-10-CM

## 2024-07-07 DIAGNOSIS — E78.2 MIXED HYPERLIPIDEMIA: ICD-10-CM

## 2024-07-07 DIAGNOSIS — I10 PRIMARY HYPERTENSION: ICD-10-CM

## 2024-07-07 DIAGNOSIS — Z79.4 CONTROLLED TYPE 2 DIABETES MELLITUS WITH HYPERGLYCEMIA, WITH LONG-TERM CURRENT USE OF INSULIN (MULTI): ICD-10-CM

## 2024-07-08 RX ORDER — METFORMIN HYDROCHLORIDE 1000 MG/1
1000 TABLET ORAL 2 TIMES DAILY
Qty: 180 TABLET | Refills: 3 | Status: SHIPPED | OUTPATIENT
Start: 2024-07-08

## 2024-07-08 RX ORDER — ATORVASTATIN CALCIUM 20 MG/1
20 TABLET, FILM COATED ORAL DAILY
Qty: 90 TABLET | Refills: 3 | Status: SHIPPED | OUTPATIENT
Start: 2024-07-08

## 2024-07-08 RX ORDER — LISINOPRIL 40 MG/1
40 TABLET ORAL DAILY
Qty: 90 TABLET | Refills: 3 | Status: SHIPPED | OUTPATIENT
Start: 2024-07-08

## 2024-07-10 DIAGNOSIS — Z79.4 CONTROLLED TYPE 2 DIABETES MELLITUS WITH HYPERGLYCEMIA, WITH LONG-TERM CURRENT USE OF INSULIN (MULTI): ICD-10-CM

## 2024-07-10 DIAGNOSIS — E11.65 CONTROLLED TYPE 2 DIABETES MELLITUS WITH HYPERGLYCEMIA, WITH LONG-TERM CURRENT USE OF INSULIN (MULTI): ICD-10-CM

## 2024-07-11 RX ORDER — TIRZEPATIDE 5 MG/.5ML
INJECTION, SOLUTION SUBCUTANEOUS
Qty: 2 ML | Refills: 0 | Status: SHIPPED | OUTPATIENT
Start: 2024-07-11

## 2024-07-15 ENCOUNTER — HOSPITAL ENCOUNTER (OUTPATIENT)
Dept: CARDIOLOGY | Facility: HOSPITAL | Age: 69
Discharge: HOME | End: 2024-07-15
Payer: COMMERCIAL

## 2024-07-15 DIAGNOSIS — I50.22 CHRONIC SYSTOLIC HEART FAILURE (MULTI): ICD-10-CM

## 2024-07-15 DIAGNOSIS — R06.09 DOE (DYSPNEA ON EXERTION): ICD-10-CM

## 2024-07-15 PROCEDURE — 2500000004 HC RX 250 GENERAL PHARMACY W/ HCPCS (ALT 636 FOR OP/ED): Performed by: NURSE PRACTITIONER

## 2024-07-15 PROCEDURE — 93306 TTE W/DOPPLER COMPLETE: CPT

## 2024-07-15 PROCEDURE — 93306 TTE W/DOPPLER COMPLETE: CPT | Performed by: INTERNAL MEDICINE

## 2024-07-16 LAB
AORTIC VALVE MEAN GRADIENT: 3.2 MMHG
AORTIC VALVE PEAK VELOCITY: 1.16 M/S
AV PEAK GRADIENT: 5.4 MMHG
AVA (PEAK VEL): 2.89 CM2
AVA (VTI): 2.63 CM2
EJECTION FRACTION APICAL 4 CHAMBER: 52.8
EJECTION FRACTION: 53 %
LEFT ATRIUM VOLUME AREA LENGTH INDEX BSA: 50.7 ML/M2
LEFT VENTRICLE INTERNAL DIMENSION DIASTOLE: 4.96 CM (ref 3.5–6)
LEFT VENTRICULAR OUTFLOW TRACT DIAMETER: 2.01 CM
LV EJECTION FRACTION BIPLANE: 53 %
MITRAL VALVE E/A RATIO: 2.92
RIGHT VENTRICLE FREE WALL PEAK S': 14.87 CM/S
TRICUSPID ANNULAR PLANE SYSTOLIC EXCURSION: 2.9 CM

## 2024-07-28 DIAGNOSIS — E11.65 CONTROLLED TYPE 2 DIABETES MELLITUS WITH HYPERGLYCEMIA, WITH LONG-TERM CURRENT USE OF INSULIN (MULTI): ICD-10-CM

## 2024-07-28 DIAGNOSIS — Z79.4 CONTROLLED TYPE 2 DIABETES MELLITUS WITH HYPERGLYCEMIA, WITH LONG-TERM CURRENT USE OF INSULIN (MULTI): ICD-10-CM

## 2024-07-29 RX ORDER — PEN NEEDLE, DIABETIC 30 GX3/16"
NEEDLE, DISPOSABLE MISCELLANEOUS
Qty: 400 EACH | Refills: 3 | Status: SHIPPED | OUTPATIENT
Start: 2024-07-29

## 2024-08-07 ENCOUNTER — LAB (OUTPATIENT)
Dept: LAB | Facility: LAB | Age: 69
End: 2024-08-07
Payer: COMMERCIAL

## 2024-08-07 ENCOUNTER — TELEPHONE (OUTPATIENT)
Dept: PRIMARY CARE | Facility: CLINIC | Age: 69
End: 2024-08-07

## 2024-08-07 ENCOUNTER — APPOINTMENT (OUTPATIENT)
Dept: PRIMARY CARE | Facility: CLINIC | Age: 69
End: 2024-08-07
Payer: COMMERCIAL

## 2024-08-07 VITALS
HEART RATE: 39 BPM | TEMPERATURE: 96.8 F | SYSTOLIC BLOOD PRESSURE: 102 MMHG | DIASTOLIC BLOOD PRESSURE: 58 MMHG | BODY MASS INDEX: 28.36 KG/M2 | OXYGEN SATURATION: 98 % | WEIGHT: 233 LBS

## 2024-08-07 DIAGNOSIS — J01.00 ACUTE NON-RECURRENT MAXILLARY SINUSITIS: ICD-10-CM

## 2024-08-07 DIAGNOSIS — Z79.4 CONTROLLED TYPE 2 DIABETES MELLITUS WITH HYPERGLYCEMIA, WITH LONG-TERM CURRENT USE OF INSULIN (MULTI): ICD-10-CM

## 2024-08-07 DIAGNOSIS — E78.2 HYPERLIPIDEMIA, MIXED: ICD-10-CM

## 2024-08-07 DIAGNOSIS — I48.0 PAROXYSMAL ATRIAL FIBRILLATION (MULTI): ICD-10-CM

## 2024-08-07 DIAGNOSIS — J01.00 ACUTE NON-RECURRENT MAXILLARY SINUSITIS: Primary | ICD-10-CM

## 2024-08-07 DIAGNOSIS — I10 PRIMARY HYPERTENSION: ICD-10-CM

## 2024-08-07 DIAGNOSIS — E11.65 CONTROLLED TYPE 2 DIABETES MELLITUS WITH HYPERGLYCEMIA, WITH LONG-TERM CURRENT USE OF INSULIN (MULTI): ICD-10-CM

## 2024-08-07 DIAGNOSIS — I50.22 CHRONIC SYSTOLIC HEART FAILURE (MULTI): ICD-10-CM

## 2024-08-07 DIAGNOSIS — I10 ESSENTIAL HYPERTENSION: ICD-10-CM

## 2024-08-07 PROBLEM — E78.5 HYPERLIPIDEMIA: Status: ACTIVE | Noted: 2022-10-05

## 2024-08-07 LAB
ALBUMIN SERPL BCP-MCNC: 3.9 G/DL (ref 3.4–5)
ALP SERPL-CCNC: 34 U/L (ref 33–136)
ALT SERPL W P-5'-P-CCNC: 18 U/L (ref 10–52)
ANION GAP SERPL CALC-SCNC: 12 MMOL/L (ref 10–20)
AST SERPL W P-5'-P-CCNC: 20 U/L (ref 9–39)
BILIRUB SERPL-MCNC: 0.5 MG/DL (ref 0–1.2)
BUN SERPL-MCNC: 18 MG/DL (ref 6–23)
CALCIUM SERPL-MCNC: 9.2 MG/DL (ref 8.6–10.3)
CHLORIDE SERPL-SCNC: 100 MMOL/L (ref 98–107)
CHOLEST SERPL-MCNC: 72 MG/DL (ref 0–199)
CHOLESTEROL/HDL RATIO: 1.9
CO2 SERPL-SCNC: 30 MMOL/L (ref 21–32)
CREAT SERPL-MCNC: 0.94 MG/DL (ref 0.5–1.3)
EGFRCR SERPLBLD CKD-EPI 2021: 87 ML/MIN/1.73M*2
EST. AVERAGE GLUCOSE BLD GHB EST-MCNC: 146 MG/DL
GLUCOSE SERPL-MCNC: 113 MG/DL (ref 74–99)
HBA1C MFR BLD: 6.7 %
HDLC SERPL-MCNC: 38 MG/DL
LDLC SERPL CALC-MCNC: 19 MG/DL
NON HDL CHOLESTEROL: 34 MG/DL (ref 0–149)
POTASSIUM SERPL-SCNC: 4.1 MMOL/L (ref 3.5–5.3)
PROT SERPL-MCNC: 6.6 G/DL (ref 6.4–8.2)
SODIUM SERPL-SCNC: 138 MMOL/L (ref 136–145)
TRIGL SERPL-MCNC: 77 MG/DL (ref 0–149)
VLDL: 15 MG/DL (ref 0–40)

## 2024-08-07 PROCEDURE — 4010F ACE/ARB THERAPY RXD/TAKEN: CPT | Performed by: FAMILY MEDICINE

## 2024-08-07 PROCEDURE — 80053 COMPREHEN METABOLIC PANEL: CPT

## 2024-08-07 PROCEDURE — 1036F TOBACCO NON-USER: CPT | Performed by: FAMILY MEDICINE

## 2024-08-07 PROCEDURE — 83036 HEMOGLOBIN GLYCOSYLATED A1C: CPT

## 2024-08-07 PROCEDURE — 3044F HG A1C LEVEL LT 7.0%: CPT | Performed by: FAMILY MEDICINE

## 2024-08-07 PROCEDURE — 80061 LIPID PANEL: CPT

## 2024-08-07 PROCEDURE — 3074F SYST BP LT 130 MM HG: CPT | Performed by: FAMILY MEDICINE

## 2024-08-07 PROCEDURE — 3048F LDL-C <100 MG/DL: CPT | Performed by: FAMILY MEDICINE

## 2024-08-07 PROCEDURE — 3078F DIAST BP <80 MM HG: CPT | Performed by: FAMILY MEDICINE

## 2024-08-07 PROCEDURE — 1159F MED LIST DOCD IN RCRD: CPT | Performed by: FAMILY MEDICINE

## 2024-08-07 PROCEDURE — 99214 OFFICE O/P EST MOD 30 MIN: CPT | Performed by: FAMILY MEDICINE

## 2024-08-07 PROCEDURE — 36415 COLL VENOUS BLD VENIPUNCTURE: CPT

## 2024-08-07 RX ORDER — AMOXICILLIN AND CLAVULANATE POTASSIUM 875; 125 MG/1; MG/1
875 TABLET, FILM COATED ORAL 2 TIMES DAILY
Qty: 20 TABLET | Refills: 0 | Status: SHIPPED | OUTPATIENT
Start: 2024-08-07 | End: 2024-08-17

## 2024-08-07 NOTE — PROGRESS NOTES
Subjective   Patient ID: Lux Camejo is a 69 y.o. male who presents for Hypertension (Recheck ) and Diabetes.  Diabetes      HPI: Diabetes, hypertension and increased cholesterol.       All      Lipids well controlled last check, checking today.        Afib: stable.      DM2 is pending. Doing well.   Has been cutting back on insulin because he's gotten some lows.      Patient Active Problem List   Diagnosis    Paroxysmal atrial fibrillation (Multi)    Gastroesophageal reflux disease    Chronic systolic heart failure (Multi)    COPD (chronic obstructive pulmonary disease) (Multi)    Diabetes mellitus (Multi)    Dyslipidemia    Essential hypertension    Obesity (BMI 30-39.9)    Obstructive sleep apnea    Seasonal allergies    Type 2 diabetes mellitus with diabetic neuropathy, unspecified (Multi)    Hyperlipidemia       Social Connections: Not on file       Current Outpatient Medications on File Prior to Visit   Medication Sig Dispense Refill    aspirin 81 mg chewable tablet Chew 1 tablet (81 mg) once daily.      atorvastatin (Lipitor) 20 mg tablet TAKE ONE TABLET BY MOUTH EVERY DAY 90 tablet 3    Dexcom G7  misc Use as instructed 1 each 0    Dexcom G7 Sensor device To be switched every 10 days 8 each 3    FreeStyle Andrés 3 Sensor device To be used q2 weeks 6 each 3    gabapentin (Neurontin) 400 mg capsule Take 1 capsule (400 mg) by mouth 3 times a day. 270 capsule 1    hydroCHLOROthiazide (Microzide) 12.5 mg tablet Take 1 tablet (12.5 mg) by mouth once daily. 90 tablet 3    lisinopril 40 mg tablet TAKE ONE TABLET BY MOUTH EVERY DAY 90 tablet 3    metFORMIN (Glucophage) 1,000 mg tablet TAKE ONE TABLET BY MOUTH TWICE A  tablet 3    Mounjaro 5 mg/0.5 mL pen injector INJECT 5 MG UNDER THE SKIN 1 TIME PER WEEK 2 mL 0    NovoLOG Flexpen U-100 Insulin 100 unit/mL (3 mL) pen INJECT 15 UNITS UNDER THE SKIN BEFORE EACH MEAL THREE TIMES A DAY 39 mL 11    omega-3 acid ethyl esters (Lovaza) 1 gram capsule Take 1  "capsule (1 g) by mouth once daily. 90 capsule 3    OneTouch Delica Plus Lancet 30 gauge misc       OneTouch Ultra Test strip       pen needle, diabetic (BD Ultra-Fine Mini Pen Needle) 31 gauge x 3/16\" needle TO BE USED 4 TIMES A  each 3    Xarelto 20 mg tablet Take 1 tablet (20 mg) by mouth once daily in the evening. Take with meals. 90 tablet 3    Xultophy 100/3.6 100 unit-3.6 mg /mL (3 mL) insulin pen STARTING WITH INJECTING 40 UNITS UNDER THE SKIN DAILY AND INCREASE BY 2 UNITS A DAY UNTIL FASTING BLOOD SUGARS ARE LESS THAN 140 45 mL 3    [DISCONTINUED] amLODIPine (Norvasc) 5 mg tablet Take 1 tablet (5 mg) by mouth once daily. 90 tablet 3    [DISCONTINUED] metoprolol succinate XL (Toprol-XL) 25 mg 24 hr tablet Take 1 tablet (25 mg) by mouth once daily. 90 tablet 3    fluticasone (Flonase) 50 mcg/actuation nasal spray Administer 2 sprays into each nostril once daily. 48 g 3    tirzepatide (Mounjaro) 12.5 mg/0.5 mL pen injector Inject 12.5 mg under the skin 1 (one) time per week. (Patient not taking: Reported on 6/25/2024) 6 mL 1    tirzepatide (Mounjaro) 7.5 mg/0.5 mL pen injector Inject 7.5 mg under the skin 1 (one) time per week. Do not fill before June 12, 2024. (Patient not taking: Reported on 6/25/2024) 2 mL 2     No current facility-administered medications on file prior to visit.        Vitals:    08/07/24 0819   BP: 102/58   Pulse: (!) 39   Temp: 36 °C (96.8 °F)   SpO2: 98%     Vitals:    08/07/24 0819   Weight: 106 kg (233 lb)       Review of Systems   All other systems reviewed and are negative.      Objective     Physical Exam  Constitutional:       Appearance: Normal appearance. He is well-developed.   HENT:      Head: Atraumatic.   Cardiovascular:      Rate and Rhythm: Normal rate and regular rhythm.      Heart sounds: Normal heart sounds. No murmur heard.  Pulmonary:      Effort: Pulmonary effort is normal.      Breath sounds: Normal breath sounds.   Abdominal:      General: Bowel sounds are " normal.      Palpations: Abdomen is soft.   Feet:      Right foot:      Skin integrity: Skin integrity normal.      Left foot:      Skin integrity: Skin integrity normal.   Skin:     General: Skin is warm.   Neurological:      General: No focal deficit present.      Mental Status: He is alert.   Psychiatric:         Mood and Affect: Mood normal.     Decreased sensation    Hospital Outpatient Visit on 07/15/2024   Component Date Value Ref Range Status    LVOT diam 07/15/2024 2.01  cm Final    LV Biplane EF 07/15/2024 53  % Final    MV E/A ratio 07/15/2024 2.92   Final    Tricuspid annular plane systolic e* 07/15/2024 2.9  cm Final    AV mn grad 07/15/2024 3.2  mmHg Final    LA vol index A/L 07/15/2024 50.7  ml/m2 Final    AV pk nicole 07/15/2024 1.16  m/s Final    LV EF 07/15/2024 53  % Final    RV free wall pk S' 07/15/2024 14.87  cm/s Final    LVIDd 07/15/2024 4.96  cm Final    Aortic Valve Area by Continuity of* 07/15/2024 2.63  cm2 Final    Aortic Valve Area by Continuity of* 07/15/2024 2.89  cm2 Final    AV pk grad 07/15/2024 5.4  mmHg Final    LV A4C EF 07/15/2024 52.8   Final       Assessment/Plan   Problem List Items Addressed This Visit             ICD-10-CM    Paroxysmal atrial fibrillation (Multi) I48.0    Chronic systolic heart failure (Multi) I50.22    Essential hypertension I10     Other Visit Diagnoses         Codes    Acute non-recurrent maxillary sinusitis    -  Primary J01.00    Relevant Medications    amoxicillin-pot clavulanate (Augmentin) 875-125 mg tablet    Other Relevant Orders    Comprehensive metabolic panel    Hemoglobin A1c    Lipid panel    Comprehensive metabolic panel    Lipid panel    Hemoglobin A1c    Controlled type 2 diabetes mellitus with hyperglycemia, with long-term current use of insulin (Multi)     E11.65, Z79.4    Relevant Orders    Comprehensive metabolic panel    Hemoglobin A1c    Lipid panel    Comprehensive metabolic panel    Lipid panel    Hemoglobin A1c    Hyperlipidemia,  mixed     E78.2    Primary hypertension     I10          Doing well.  Refilled meds.  Follow up in 4.  Cutting down on BP meds.  Augmentin for sinusitis.  Call if not improving.

## 2024-08-07 NOTE — TELEPHONE ENCOUNTER
----- Message from Jason De Anda sent at 8/7/2024  8:57 AM EDT -----  Please let pt know I'm going to stop his amlodipine and metoprolol because with his weight loss his BP is much better controlled.

## 2024-08-07 NOTE — TELEPHONE ENCOUNTER
----- Message from Jason De Anda sent at 8/7/2024  3:23 PM EDT -----  Pts A1c was 6.7 which is great

## 2024-08-09 DIAGNOSIS — Z79.4 CONTROLLED TYPE 2 DIABETES MELLITUS WITH HYPERGLYCEMIA, WITH LONG-TERM CURRENT USE OF INSULIN (MULTI): ICD-10-CM

## 2024-08-09 DIAGNOSIS — E11.65 CONTROLLED TYPE 2 DIABETES MELLITUS WITH HYPERGLYCEMIA, WITH LONG-TERM CURRENT USE OF INSULIN (MULTI): ICD-10-CM

## 2024-08-10 RX ORDER — TIRZEPATIDE 5 MG/.5ML
INJECTION, SOLUTION SUBCUTANEOUS
Qty: 2 ML | Refills: 3 | Status: SHIPPED | OUTPATIENT
Start: 2024-08-10

## 2024-08-11 DIAGNOSIS — I48.0 PAROXYSMAL ATRIAL FIBRILLATION (MULTI): ICD-10-CM

## 2024-08-12 RX ORDER — RIVAROXABAN 20 MG/1
TABLET, FILM COATED ORAL
Qty: 90 TABLET | Refills: 3 | Status: SHIPPED | OUTPATIENT
Start: 2024-08-12

## 2024-12-03 ENCOUNTER — APPOINTMENT (OUTPATIENT)
Dept: CARDIOLOGY | Facility: CLINIC | Age: 69
End: 2024-12-03
Payer: COMMERCIAL

## 2024-12-03 VITALS — HEIGHT: 76 IN | BODY MASS INDEX: 28.36 KG/M2

## 2024-12-03 DIAGNOSIS — I48.0 PAROXYSMAL ATRIAL FIBRILLATION (MULTI): ICD-10-CM

## 2024-12-03 DIAGNOSIS — I50.22 CHRONIC SYSTOLIC HEART FAILURE: ICD-10-CM

## 2024-12-05 ENCOUNTER — OFFICE VISIT (OUTPATIENT)
Dept: CARDIOLOGY | Facility: HOSPITAL | Age: 69
End: 2024-12-05
Payer: COMMERCIAL

## 2024-12-05 VITALS
DIASTOLIC BLOOD PRESSURE: 82 MMHG | HEIGHT: 76 IN | WEIGHT: 234 LBS | HEART RATE: 72 BPM | BODY MASS INDEX: 28.49 KG/M2 | SYSTOLIC BLOOD PRESSURE: 140 MMHG

## 2024-12-05 DIAGNOSIS — E78.2 MIXED HYPERLIPIDEMIA: ICD-10-CM

## 2024-12-05 DIAGNOSIS — I48.0 PAROXYSMAL ATRIAL FIBRILLATION (MULTI): ICD-10-CM

## 2024-12-05 DIAGNOSIS — I10 ESSENTIAL HYPERTENSION: ICD-10-CM

## 2024-12-05 DIAGNOSIS — I50.22 CHRONIC SYSTOLIC HEART FAILURE: ICD-10-CM

## 2024-12-05 LAB
Q ONSET: 212 MS
QRS COUNT: 12 BEATS
QRS DURATION: 90 MS
QT INTERVAL: 402 MS
QTC CALCULATION(BAZETT): 440 MS
QTC FREDERICIA: 427 MS
R AXIS: -33 DEGREES
T AXIS: -60 DEGREES
T OFFSET: 413 MS
VENTRICULAR RATE: 72 BPM

## 2024-12-05 PROCEDURE — 1036F TOBACCO NON-USER: CPT | Performed by: INTERNAL MEDICINE

## 2024-12-05 PROCEDURE — 93005 ELECTROCARDIOGRAM TRACING: CPT | Performed by: INTERNAL MEDICINE

## 2024-12-05 PROCEDURE — 3044F HG A1C LEVEL LT 7.0%: CPT | Performed by: INTERNAL MEDICINE

## 2024-12-05 PROCEDURE — 3048F LDL-C <100 MG/DL: CPT | Performed by: INTERNAL MEDICINE

## 2024-12-05 PROCEDURE — 99214 OFFICE O/P EST MOD 30 MIN: CPT | Performed by: INTERNAL MEDICINE

## 2024-12-05 PROCEDURE — 99214 OFFICE O/P EST MOD 30 MIN: CPT | Mod: 25 | Performed by: INTERNAL MEDICINE

## 2024-12-05 PROCEDURE — 3008F BODY MASS INDEX DOCD: CPT | Performed by: INTERNAL MEDICINE

## 2024-12-05 PROCEDURE — 3077F SYST BP >= 140 MM HG: CPT | Performed by: INTERNAL MEDICINE

## 2024-12-05 PROCEDURE — 3079F DIAST BP 80-89 MM HG: CPT | Performed by: INTERNAL MEDICINE

## 2024-12-05 PROCEDURE — 1159F MED LIST DOCD IN RCRD: CPT | Performed by: INTERNAL MEDICINE

## 2024-12-05 PROCEDURE — 93010 ELECTROCARDIOGRAM REPORT: CPT | Performed by: INTERNAL MEDICINE

## 2024-12-05 PROCEDURE — 4010F ACE/ARB THERAPY RXD/TAKEN: CPT | Performed by: INTERNAL MEDICINE

## 2024-12-05 RX ORDER — ATORVASTATIN CALCIUM 10 MG/1
10 TABLET, FILM COATED ORAL DAILY
Qty: 90 TABLET | Refills: 3 | Status: SHIPPED | OUTPATIENT
Start: 2024-12-05 | End: 2025-12-05

## 2024-12-05 RX ORDER — METOPROLOL SUCCINATE 25 MG/1
25 TABLET, EXTENDED RELEASE ORAL DAILY
COMMUNITY
Start: 2024-10-06

## 2024-12-05 NOTE — PATIENT INSTRUCTIONS
Thanks for following up in office  today.    1)  Sent new script for atorvastatin 10 mg, decreased from 20 mg due to great control and very low cholesterol. Advised to request repeat lipid panel with PCP.    2) Remember since you are on a blood thinner if you hit your head you need to go to the ER.       3)  Please continue your cardiac medications as prescribed.    4) Please check your blood pressure and heart rate twice daily for the next 1-2 weeks then call my office in 2 weeks with the values. Please bring your home BP cuff to your next visit with us.    Follow up with BILLIE Samson NP in 6 months  If you have any questions, please call [phone] and choose option for Dr. Walker's nurse Shelley Devine

## 2024-12-05 NOTE — PROGRESS NOTES
Chief Complaint:   No chief complaint on file.     History Of Present Illness:    Lux Camejo is a 69 y.o. male presenting for yearly follow up. He has h/o mild LV dysfunction (EF 45%), HTN, DL, permanent Afib, Elevated CAC Score (Total 206), CITLALLI intermittent CPAP compliance, COPD/emphysema, DM2, GERD, depression. He was originally referred for preop risk stratification prior to colonoscopy and continues to follow with us for cardiology care.     Since our last visit, the patient is doing well from cardiovascular perspective.  Denies chest pain/pressure, SOB, dyspnea on exertion, LE edema, orthopnea, PND, palpitations, LH/dizziness, presyncope, overt syncope.  Compliant with home cardiac medications as prescribed.  Denies any significant palpitations, LH/dizziness, no exertional intolerance.  On xarelto, no bleeding in urine/stools, no recent head trauma. No recent falls.  Occ loses balance, trips, infrequently.    Lost ~ 50 lbs since wife passed away.      Review Of Systems:  The remainder of the review of systems was obtained, and was negative as pertains to the chief complaint.    CV testing and labs:  7/2024 TTE EF 50-55% (previously 45%), minor WMA, normal RV syst function.    8/2024 Labs:  Lipid panel Chol 72 HDL 38 LDL 19 TRIG 77  CMP K 4.1 BUN 18 crea 0.94. No recent H&H  HgA1c 6.7     Last Recorded Vitals:  There were no vitals filed for this visit.    Past Medical History:  He has a past medical history of Personal history of other diseases of the respiratory system and Personal history of pulmonary embolism.    Past Surgical History:  He has a past surgical history that includes Other surgical history (08/31/2020) and Other surgical history (10/14/2020).      Social History:  He reports that he quit smoking about 18 years ago. His smoking use included cigarettes and cigars. He has never used smokeless tobacco. He reports that he does not currently use alcohol. He reports that he does not use  "drugs.    Family History:  Family History   Problem Relation Name Age of Onset    Cancer Mother      Heart attack Father          Allergies:  Patient has no known allergies.    Outpatient Medications:  Current Outpatient Medications   Medication Instructions    aspirin 81 mg, Daily    atorvastatin (LIPITOR) 20 mg, oral, Daily    Dexcom G7  misc Use as instructed    Dexcom G7 Sensor device To be switched every 10 days    fluticasone (Flonase) 50 mcg/actuation nasal spray 2 sprays, Each Nostril, Daily    FreeStyle Andrés 3 Sensor device To be used q2 weeks    gabapentin (NEURONTIN) 400 mg, oral, 3 times daily    hydroCHLOROthiazide (MICROZIDE) 12.5 mg, oral, Daily    lisinopril 40 mg, oral, Daily    metFORMIN (GLUCOPHAGE) 1,000 mg, oral, 2 times daily    Mounjaro 12.5 mg, subcutaneous, Once Weekly    Mounjaro 7.5 mg, subcutaneous, Once Weekly    NovoLOG Flexpen U-100 Insulin 100 unit/mL (3 mL) pen INJECT 15 UNITS UNDER THE SKIN BEFORE EACH MEAL THREE TIMES A DAY    omega-3 acid ethyl esters (LOVAZA) 1 g, oral, Daily    OneTouch Delica Plus Lancet 30 gauge misc     OneTouch Ultra Test strip     pen needle, diabetic (BD Ultra-Fine Mini Pen Needle) 31 gauge x 3/16\" needle TO BE USED 4 TIMES A DAY    tirzepatide (Mounjaro) 5 mg/0.5 mL pen injector INJECT 5MG UNDER THE SKIN 1 TIME PER WEEK.    Xarelto 20 mg tablet TAKE ONE TABLET BY MOUTH EVERY EVENING, TAKE WITH MEALS    Xultophy 100/3.6 100 unit-3.6 mg /mL (3 mL) insulin pen STARTING WITH INJECTING 40 UNITS UNDER THE SKIN DAILY AND INCREASE BY 2 UNITS A DAY UNTIL FASTING BLOOD SUGARS ARE LESS THAN 140       Physical Exam:  Physical Exam  Vitals reviewed.   HENT:      Head: Normocephalic.      Nose: Nose normal.      Mouth/Throat:      Mouth: Mucous membranes are moist.   Cardiovascular:      Rate and Rhythm: Normal rate and regular rhythm.      Heart sounds: S1 normal and S2 normal. No murmur heard.     Comments: No carotid bruits  No JVD at 90 degress  Pulmonary: "      Effort: Pulmonary effort is normal.      Breath sounds: Normal breath sounds.   Abdominal:      General: Abdomen is flat.      Palpations: Abdomen is soft.   Musculoskeletal:         General: Normal range of motion.      Cervical back: Normal range of motion.   Skin:     General: Skin is warm and dry.      Comments: Anterior neck with small area of erythema and swelling that he states is chronic    Neurological:      General: No focal deficit present.      Mental Status: He is alert.   Psychiatric:         Mood and Affect: Mood normal.       Last Labs:  CBC -  Lab Results   Component Value Date    WBC 8.2 03/22/2022    HGB 14.7 03/22/2022    HCT 46.6 03/22/2022    MCV 96 03/22/2022     03/22/2022       CMP -  Lab Results   Component Value Date    CALCIUM 9.2 08/07/2024    PROT 6.6 08/07/2024    ALBUMIN 3.9 08/07/2024    AST 20 08/07/2024    ALT 18 08/07/2024    ALKPHOS 34 08/07/2024    BILITOT 0.5 08/07/2024       LIPID PANEL -   Lab Results   Component Value Date    CHOL 72 08/07/2024    TRIG 77 08/07/2024    HDL 38.0 08/07/2024    CHHDL 1.9 08/07/2024    LDLF 30 02/28/2023    VLDL 15 08/07/2024    NHDL 34 08/07/2024       RENAL FUNCTION PANEL -   Lab Results   Component Value Date    GLUCOSE 113 (H) 08/07/2024     08/07/2024    K 4.1 08/07/2024     08/07/2024    CO2 30 08/07/2024    ANIONGAP 12 08/07/2024    BUN 18 08/07/2024    CREATININE 0.94 08/07/2024    GFRMALE 85 02/28/2023    CALCIUM 9.2 08/07/2024    ALBUMIN 3.9 08/07/2024        Lab Results   Component Value Date    HGBA1C 6.7 (H) 08/07/2024       Assessment/Plan   Chronic atrial fibrillation: Patient on exam is bradycardic and irregular.  He is asymptomatic.  Patient has an elevated [id number] score requiring anticoagulation.  Patient currently is on metoprolol succinate 25 mg daily and Xarelto 20 mg daily.  Patient is not having any abnormal bleeding or bruising and should continue on anticoagulation as well as rate control.      Hypertension: Blood pressure today shows reasonable control at 140/82.  Patient will continue on amlodipine 5 mg daily, hydrochlorothiazide 12.5 mg daily, lisinopril 40 mg daily, metoprolol succinate 25 mg daily.  I encouraged him to be on a low-sodium diet.  Repeat BP today still elevated after his PCP adjusted BP meds - recommend 1-2 week BP log and to call office with values.     Chronic systolic heart failure: Patient's echocardiogram in 2020 showed an ejection fraction of 45%.  He has been on a heart failure approved beta-blocker, lisinopril, and hydrochlorothiazide.  On exam, he does not show signs of volume overload.   7/2024 TTE EF 50-55% (previously 45%), minor WMA, normal RV syst function.     Dyslipidemia: 8/2024 Lipid panel Chol 72 HDL 38 LDL 19 TRIG 77. We will reduce atorvastatin to 10mg daily     Depression: We discussed depression and grieving with the loss of his wife earlier this year.       Scribe Attestation  By signing my name below, I, Obdulio Newsome   attest that this documentation has been prepared under the direction and in the presence of Breezy Walker MD.

## 2024-12-10 ENCOUNTER — TELEPHONE (OUTPATIENT)
Dept: PRIMARY CARE | Facility: CLINIC | Age: 69
End: 2024-12-10

## 2024-12-10 ENCOUNTER — LAB (OUTPATIENT)
Dept: LAB | Facility: LAB | Age: 69
End: 2024-12-10
Payer: COMMERCIAL

## 2024-12-10 ENCOUNTER — APPOINTMENT (OUTPATIENT)
Dept: PRIMARY CARE | Facility: CLINIC | Age: 69
End: 2024-12-10
Payer: COMMERCIAL

## 2024-12-10 VITALS
WEIGHT: 232 LBS | HEART RATE: 59 BPM | BODY MASS INDEX: 28.24 KG/M2 | SYSTOLIC BLOOD PRESSURE: 130 MMHG | OXYGEN SATURATION: 98 % | DIASTOLIC BLOOD PRESSURE: 76 MMHG | TEMPERATURE: 97 F

## 2024-12-10 DIAGNOSIS — Z79.4 CONTROLLED TYPE 2 DIABETES MELLITUS WITH HYPERGLYCEMIA, WITH LONG-TERM CURRENT USE OF INSULIN (MULTI): ICD-10-CM

## 2024-12-10 DIAGNOSIS — J01.00 ACUTE NON-RECURRENT MAXILLARY SINUSITIS: ICD-10-CM

## 2024-12-10 DIAGNOSIS — I10 PRIMARY HYPERTENSION: Primary | ICD-10-CM

## 2024-12-10 DIAGNOSIS — E11.65 CONTROLLED TYPE 2 DIABETES MELLITUS WITH HYPERGLYCEMIA, WITH LONG-TERM CURRENT USE OF INSULIN (MULTI): ICD-10-CM

## 2024-12-10 DIAGNOSIS — M79.605 PAIN IN BOTH LOWER EXTREMITIES: ICD-10-CM

## 2024-12-10 DIAGNOSIS — G62.9 NEUROPATHY: ICD-10-CM

## 2024-12-10 DIAGNOSIS — E11.9 TYPE 2 DIABETES MELLITUS WITHOUT COMPLICATION, WITH LONG-TERM CURRENT USE OF INSULIN (MULTI): ICD-10-CM

## 2024-12-10 DIAGNOSIS — M79.604 PAIN IN BOTH LOWER EXTREMITIES: ICD-10-CM

## 2024-12-10 DIAGNOSIS — Z79.4 TYPE 2 DIABETES MELLITUS WITHOUT COMPLICATION, WITH LONG-TERM CURRENT USE OF INSULIN (MULTI): ICD-10-CM

## 2024-12-10 LAB
ALBUMIN SERPL BCP-MCNC: 4 G/DL (ref 3.4–5)
ALP SERPL-CCNC: 39 U/L (ref 33–136)
ALT SERPL W P-5'-P-CCNC: 22 U/L (ref 10–52)
ANION GAP SERPL CALC-SCNC: 8 MMOL/L (ref 10–20)
AST SERPL W P-5'-P-CCNC: 22 U/L (ref 9–39)
BILIRUB SERPL-MCNC: 0.5 MG/DL (ref 0–1.2)
BUN SERPL-MCNC: 16 MG/DL (ref 6–23)
CALCIUM SERPL-MCNC: 8.9 MG/DL (ref 8.6–10.3)
CHLORIDE SERPL-SCNC: 100 MMOL/L (ref 98–107)
CHOLEST SERPL-MCNC: 95 MG/DL (ref 0–199)
CHOLESTEROL/HDL RATIO: 2.1
CO2 SERPL-SCNC: 32 MMOL/L (ref 21–32)
CREAT SERPL-MCNC: 0.91 MG/DL (ref 0.5–1.3)
EGFRCR SERPLBLD CKD-EPI 2021: 90 ML/MIN/1.73M*2
EST. AVERAGE GLUCOSE BLD GHB EST-MCNC: 131 MG/DL
GLUCOSE SERPL-MCNC: 129 MG/DL (ref 74–99)
HBA1C MFR BLD: 6.2 %
HDLC SERPL-MCNC: 45.8 MG/DL
LDLC SERPL CALC-MCNC: 36 MG/DL
NON HDL CHOLESTEROL: 49 MG/DL (ref 0–149)
POTASSIUM SERPL-SCNC: 4.2 MMOL/L (ref 3.5–5.3)
PROT SERPL-MCNC: 6.8 G/DL (ref 6.4–8.2)
SODIUM SERPL-SCNC: 136 MMOL/L (ref 136–145)
TRIGL SERPL-MCNC: 67 MG/DL (ref 0–149)
VLDL: 13 MG/DL (ref 0–40)

## 2024-12-10 PROCEDURE — 36415 COLL VENOUS BLD VENIPUNCTURE: CPT

## 2024-12-10 PROCEDURE — 1036F TOBACCO NON-USER: CPT | Performed by: FAMILY MEDICINE

## 2024-12-10 PROCEDURE — 3044F HG A1C LEVEL LT 7.0%: CPT | Performed by: FAMILY MEDICINE

## 2024-12-10 PROCEDURE — 3078F DIAST BP <80 MM HG: CPT | Performed by: FAMILY MEDICINE

## 2024-12-10 PROCEDURE — 80061 LIPID PANEL: CPT

## 2024-12-10 PROCEDURE — 83036 HEMOGLOBIN GLYCOSYLATED A1C: CPT

## 2024-12-10 PROCEDURE — 99214 OFFICE O/P EST MOD 30 MIN: CPT | Performed by: FAMILY MEDICINE

## 2024-12-10 PROCEDURE — 3048F LDL-C <100 MG/DL: CPT | Performed by: FAMILY MEDICINE

## 2024-12-10 PROCEDURE — 80053 COMPREHEN METABOLIC PANEL: CPT

## 2024-12-10 PROCEDURE — 3075F SYST BP GE 130 - 139MM HG: CPT | Performed by: FAMILY MEDICINE

## 2024-12-10 PROCEDURE — 1159F MED LIST DOCD IN RCRD: CPT | Performed by: FAMILY MEDICINE

## 2024-12-10 PROCEDURE — 4010F ACE/ARB THERAPY RXD/TAKEN: CPT | Performed by: FAMILY MEDICINE

## 2024-12-10 RX ORDER — (INSULIN DEGLUDEC AND LIRAGLUTIDE) 100; 3.6 [IU]/ML; MG/ML
40 INJECTION, SOLUTION SUBCUTANEOUS DAILY
Qty: 45 ML | Refills: 3 | Status: SHIPPED | OUTPATIENT
Start: 2024-12-10

## 2024-12-10 RX ORDER — INSULIN ASPART 100 [IU]/ML
15 INJECTION, SOLUTION INTRAVENOUS; SUBCUTANEOUS
Qty: 39 ML | Refills: 3 | Status: SHIPPED | OUTPATIENT
Start: 2024-12-10

## 2024-12-10 RX ORDER — GABAPENTIN 400 MG/1
400 CAPSULE ORAL 3 TIMES DAILY
Qty: 270 CAPSULE | Refills: 1 | Status: SHIPPED | OUTPATIENT
Start: 2024-12-10 | End: 2025-06-08

## 2024-12-10 RX ORDER — METOPROLOL SUCCINATE 25 MG/1
25 TABLET, EXTENDED RELEASE ORAL DAILY
Qty: 180 TABLET | Refills: 1 | Status: SHIPPED | OUTPATIENT
Start: 2024-12-10

## 2024-12-10 RX ORDER — TIRZEPATIDE 5 MG/.5ML
5 INJECTION, SOLUTION SUBCUTANEOUS WEEKLY
Qty: 2 ML | Refills: 3 | Status: SHIPPED | OUTPATIENT
Start: 2024-12-10

## 2024-12-10 RX ORDER — INSULIN ASPART 100 [IU]/ML
15 INJECTION, SOLUTION INTRAVENOUS; SUBCUTANEOUS
Qty: 39 ML | Refills: 3 | Status: SHIPPED | OUTPATIENT
Start: 2024-12-10 | End: 2024-12-10 | Stop reason: SDUPTHER

## 2024-12-10 ASSESSMENT — PATIENT HEALTH QUESTIONNAIRE - PHQ9
SUM OF ALL RESPONSES TO PHQ9 QUESTIONS 1 AND 2: 0
2. FEELING DOWN, DEPRESSED OR HOPELESS: NOT AT ALL
1. LITTLE INTEREST OR PLEASURE IN DOING THINGS: NOT AT ALL

## 2024-12-10 NOTE — TELEPHONE ENCOUNTER
PA on pended med - DENIED    The preferred drugs for your plan are: Fiasp, Novolog.      Please advise Thx

## 2024-12-10 NOTE — PROGRESS NOTES
"Subjective   Patient ID: Lux Camejo is a 69 y.o. male who presents for Diabetes, Hypertension, and GERD (recheck).  Diabetes      HPI: Diabetes, hypertension and increased cholesterol.       All  Diabetes: No medication side effects noted. Trying to follow recommended diet. Patient is exercising. Hypoglycemic Episodes: None.    End All         Lipids well controlled last check, checking today.        Afib: stable.      DM2 is pending.  Doing 5 mg and doing well.      Patient Active Problem List   Diagnosis    Paroxysmal atrial fibrillation (Multi)    Gastroesophageal reflux disease    Chronic systolic heart failure    COPD (chronic obstructive pulmonary disease) (Multi)    Diabetes mellitus (Multi)    Dyslipidemia    Essential hypertension    Obesity (BMI 30-39.9)    Obstructive sleep apnea    Seasonal allergies    Type 2 diabetes mellitus with diabetic neuropathy, unspecified (Multi)    Hyperlipidemia       Social Connections: Not on file       Current Outpatient Medications on File Prior to Visit   Medication Sig Dispense Refill    aspirin 81 mg chewable tablet Chew 1 tablet (81 mg) once daily.      atorvastatin (Lipitor) 10 mg tablet Take 1 tablet (10 mg) by mouth once daily. 90 tablet 3    FreeStyle Andrés 3 Sensor device To be used q2 weeks 6 each 3    hydroCHLOROthiazide (Microzide) 12.5 mg tablet Take 1 tablet (12.5 mg) by mouth once daily. 90 tablet 3    lisinopril 40 mg tablet TAKE ONE TABLET BY MOUTH EVERY DAY 90 tablet 3    metFORMIN (Glucophage) 1,000 mg tablet TAKE ONE TABLET BY MOUTH TWICE A  tablet 3    omega-3 acid ethyl esters (Lovaza) 1 gram capsule Take 1 capsule (1 g) by mouth once daily. 90 capsule 3    OneTouch Delica Plus Lancet 30 gauge misc       OneTouch Ultra Test strip       pen needle, diabetic (BD Ultra-Fine Mini Pen Needle) 31 gauge x 3/16\" needle TO BE USED 4 TIMES A  each 3    Xarelto 20 mg tablet TAKE ONE TABLET BY MOUTH EVERY EVENING, TAKE WITH MEALS 90 tablet 3    " [DISCONTINUED] metoprolol succinate XL (Toprol-XL) 25 mg 24 hr tablet Take 1 tablet (25 mg) by mouth once daily.      [DISCONTINUED] NovoLOG Flexpen U-100 Insulin 100 unit/mL (3 mL) pen INJECT 15 UNITS UNDER THE SKIN BEFORE EACH MEAL THREE TIMES A DAY 39 mL 11    [DISCONTINUED] tirzepatide (Mounjaro) 5 mg/0.5 mL pen injector INJECT 5MG UNDER THE SKIN 1 TIME PER WEEK. 2 mL 3    [DISCONTINUED] Xultophy 100/3.6 100 unit-3.6 mg /mL (3 mL) insulin pen STARTING WITH INJECTING 40 UNITS UNDER THE SKIN DAILY AND INCREASE BY 2 UNITS A DAY UNTIL FASTING BLOOD SUGARS ARE LESS THAN 140 45 mL 3    [DISCONTINUED] atorvastatin (Lipitor) 20 mg tablet TAKE ONE TABLET BY MOUTH EVERY DAY 90 tablet 3    [DISCONTINUED] Dexcom G7  misc Use as instructed (Patient not taking: Reported on 12/5/2024) 1 each 0    [DISCONTINUED] Dexcom G7 Sensor device To be switched every 10 days (Patient not taking: Reported on 12/5/2024) 8 each 3    [DISCONTINUED] fluticasone (Flonase) 50 mcg/actuation nasal spray Administer 2 sprays into each nostril once daily. (Patient not taking: No sig reported) 48 g 3    [DISCONTINUED] gabapentin (Neurontin) 400 mg capsule Take 1 capsule (400 mg) by mouth 3 times a day. 270 capsule 1    [DISCONTINUED] tirzepatide (Mounjaro) 12.5 mg/0.5 mL pen injector Inject 12.5 mg under the skin 1 (one) time per week. (Patient not taking: Reported on 6/25/2024) 6 mL 1    [DISCONTINUED] tirzepatide (Mounjaro) 7.5 mg/0.5 mL pen injector Inject 7.5 mg under the skin 1 (one) time per week. Do not fill before June 12, 2024. (Patient not taking: Reported on 6/25/2024) 2 mL 2     No current facility-administered medications on file prior to visit.        Vitals:    12/10/24 0813   BP: 130/76   Pulse: 59   Temp: 36.1 °C (97 °F)   SpO2: 98%     Vitals:    12/10/24 0813   Weight: 105 kg (232 lb)       Review of Systems   All other systems reviewed and are negative.      Objective     Physical Exam  Constitutional:       Appearance:  Normal appearance. He is well-developed.   HENT:      Head: Atraumatic.   Cardiovascular:      Rate and Rhythm: Normal rate and regular rhythm.      Heart sounds: Normal heart sounds. No murmur heard.  Pulmonary:      Effort: Pulmonary effort is normal.      Breath sounds: Normal breath sounds.   Abdominal:      General: Bowel sounds are normal.      Palpations: Abdomen is soft.   Feet:      Right foot:      Skin integrity: Skin integrity normal.      Left foot:      Skin integrity: Skin integrity normal.   Skin:     General: Skin is warm.   Neurological:      General: No focal deficit present.      Mental Status: He is alert.   Psychiatric:         Mood and Affect: Mood normal.     Decreased sensation    Office Visit on 12/05/2024   Component Date Value Ref Range Status    Ventricular Rate 12/05/2024 72  BPM Final    QRS Duration 12/05/2024 90  ms Final    QT Interval 12/05/2024 402  ms Final    QTC Calculation(Bazett) 12/05/2024 440  ms Final    R Axis 12/05/2024 -33  degrees Final    T Axis 12/05/2024 -60  degrees Final    QRS Count 12/05/2024 12  beats Final    Q Onset 12/05/2024 212  ms Final    T Offset 12/05/2024 413  ms Final    QTC Fredericia 12/05/2024 427  ms Final       Assessment/Plan   Problem List Items Addressed This Visit             ICD-10-CM    Diabetes mellitus (Multi) E11.9    Relevant Medications    insulin degludec-liraglutide (Xultophy 100/3.6) 100 unit-3.6 mg /mL (3 mL) insulin pen     Other Visit Diagnoses         Codes    Primary hypertension    -  Primary I10    Relevant Medications    metoprolol succinate XL (Toprol-XL) 25 mg 24 hr tablet    Neuropathy     G62.9    Relevant Medications    gabapentin (Neurontin) 400 mg capsule    Pain in both lower extremities     M79.604, M79.605    Relevant Medications    gabapentin (Neurontin) 400 mg capsule    Controlled type 2 diabetes mellitus with hyperglycemia, with long-term current use of insulin (Multi)     E11.65, Z79.4    Relevant Medications     insulin aspart (NovoLOG Flexpen U-100 Insulin) 100 unit/mL (3 mL) pen    tirzepatide (Mounjaro) 5 mg/0.5 mL pen injector    Other Relevant Orders    Comprehensive metabolic panel    Lipid panel    Hemoglobin A1c          Doing well.  Refilled meds.  Follow up in 4 mo.

## 2024-12-18 ENCOUNTER — TELEPHONE (OUTPATIENT)
Dept: PRIMARY CARE | Facility: CLINIC | Age: 69
End: 2024-12-18
Payer: COMMERCIAL

## 2024-12-18 NOTE — TELEPHONE ENCOUNTER
A1c was 6.2 which is fantastic for him.  Everything looks great- keep it up. - MKC      Called and spoke with pt, informed of provider appendage. CG

## 2025-02-26 DIAGNOSIS — E11.65 CONTROLLED TYPE 2 DIABETES MELLITUS WITH HYPERGLYCEMIA, WITH LONG-TERM CURRENT USE OF INSULIN (MULTI): ICD-10-CM

## 2025-02-26 DIAGNOSIS — Z79.4 CONTROLLED TYPE 2 DIABETES MELLITUS WITH HYPERGLYCEMIA, WITH LONG-TERM CURRENT USE OF INSULIN (MULTI): ICD-10-CM

## 2025-02-26 RX ORDER — BLOOD-GLUCOSE SENSOR
EACH MISCELLANEOUS
Qty: 6 EACH | Refills: 3 | Status: SHIPPED | OUTPATIENT
Start: 2025-02-26

## 2025-04-10 ENCOUNTER — APPOINTMENT (OUTPATIENT)
Dept: PRIMARY CARE | Facility: CLINIC | Age: 70
End: 2025-04-10
Payer: COMMERCIAL

## 2025-04-10 VITALS
WEIGHT: 236 LBS | OXYGEN SATURATION: 98 % | HEART RATE: 50 BPM | DIASTOLIC BLOOD PRESSURE: 84 MMHG | BODY MASS INDEX: 28.73 KG/M2 | SYSTOLIC BLOOD PRESSURE: 124 MMHG | TEMPERATURE: 97.4 F

## 2025-04-10 DIAGNOSIS — G62.9 NEUROPATHY: ICD-10-CM

## 2025-04-10 DIAGNOSIS — I10 PRIMARY HYPERTENSION: ICD-10-CM

## 2025-04-10 DIAGNOSIS — E11.65 CONTROLLED TYPE 2 DIABETES MELLITUS WITH HYPERGLYCEMIA, WITH LONG-TERM CURRENT USE OF INSULIN (MULTI): Primary | ICD-10-CM

## 2025-04-10 DIAGNOSIS — Z00.00 ROUTINE ADULT HEALTH MAINTENANCE: ICD-10-CM

## 2025-04-10 DIAGNOSIS — Z79.4 CONTROLLED TYPE 2 DIABETES MELLITUS WITH HYPERGLYCEMIA, WITH LONG-TERM CURRENT USE OF INSULIN (MULTI): Primary | ICD-10-CM

## 2025-04-10 DIAGNOSIS — E78.2 HYPERLIPIDEMIA, MIXED: ICD-10-CM

## 2025-04-10 PROCEDURE — 99214 OFFICE O/P EST MOD 30 MIN: CPT | Performed by: FAMILY MEDICINE

## 2025-04-10 PROCEDURE — 4010F ACE/ARB THERAPY RXD/TAKEN: CPT | Performed by: FAMILY MEDICINE

## 2025-04-10 PROCEDURE — 3074F SYST BP LT 130 MM HG: CPT | Performed by: FAMILY MEDICINE

## 2025-04-10 PROCEDURE — 3079F DIAST BP 80-89 MM HG: CPT | Performed by: FAMILY MEDICINE

## 2025-04-10 PROCEDURE — 1036F TOBACCO NON-USER: CPT | Performed by: FAMILY MEDICINE

## 2025-04-10 PROCEDURE — 99397 PER PM REEVAL EST PAT 65+ YR: CPT | Performed by: FAMILY MEDICINE

## 2025-04-10 PROCEDURE — 1159F MED LIST DOCD IN RCRD: CPT | Performed by: FAMILY MEDICINE

## 2025-04-10 RX ORDER — FLUTICASONE PROPIONATE 50 MCG
1 SPRAY, SUSPENSION (ML) NASAL DAILY
Qty: 16 G | Refills: 5 | Status: SHIPPED | OUTPATIENT
Start: 2025-04-10 | End: 2026-04-10

## 2025-04-10 ASSESSMENT — PATIENT HEALTH QUESTIONNAIRE - PHQ9
1. LITTLE INTEREST OR PLEASURE IN DOING THINGS: NOT AT ALL
2. FEELING DOWN, DEPRESSED OR HOPELESS: NOT AT ALL
SUM OF ALL RESPONSES TO PHQ9 QUESTIONS 1 AND 2: 0

## 2025-04-10 NOTE — PROGRESS NOTES
Subjective   Patient ID: Lux Camejo is a 69 y.o. male who presents for COPD, Diabetes, GERD, Hypertension, and Hyperlipidemia.  And CPE  Diabetes      HPI: Diabetes, hypertension and increased cholesterol.       All  Diabetes: No medication side effects noted. Trying to follow recommended diet. Patient is exercising. Hypoglycemic Episodes: None.    End All         Lipids well controlled last check, checking today.        Afib: stable.      DM2 is pending.  Doing 5 mg and doing well.    Neuropathy: is getting a bit worse.      Social History     Socioeconomic History    Marital status:      Spouse name: Not on file    Number of children: Not on file    Years of education: Not on file    Highest education level: Not on file   Occupational History    Not on file   Tobacco Use    Smoking status: Former     Current packs/day: 0.00     Types: Cigarettes, Cigars     Quit date:      Years since quittin.2    Smokeless tobacco: Never   Vaping Use    Vaping status: Never Used   Substance and Sexual Activity    Alcohol use: Not Currently    Drug use: Never    Sexual activity: Not on file   Other Topics Concern    Not on file   Social History Narrative    Not on file     Social Drivers of Health     Financial Resource Strain: Not on file   Food Insecurity: Not on file   Transportation Needs: Not on file   Physical Activity: Not on file   Stress: Not on file   Social Connections: Not on file   Intimate Partner Violence: Not on file   Housing Stability: Not on file         Patient Active Problem List   Diagnosis    Paroxysmal atrial fibrillation (Multi)    Gastroesophageal reflux disease    Chronic systolic heart failure    COPD (chronic obstructive pulmonary disease) (Multi)    Diabetes mellitus (Multi)    Dyslipidemia    Essential hypertension    Obesity (BMI 30-39.9)    Obstructive sleep apnea    Seasonal allergies    Type 2 diabetes mellitus with diabetic neuropathy, unspecified (Multi)    Hyperlipidemia  "      Social Connections: Not on file       Current Outpatient Medications on File Prior to Visit   Medication Sig Dispense Refill    aspirin 81 mg chewable tablet Chew 1 tablet (81 mg) once daily.      atorvastatin (Lipitor) 10 mg tablet Take 1 tablet (10 mg) by mouth once daily. 90 tablet 3    blood-glucose sensor (FreeStyle Andrés 3 Sensor) device USE AS DIRECTED EVERY 2 WEEKS 6 each 3    gabapentin (Neurontin) 400 mg capsule Take 1 capsule (400 mg) by mouth 3 times a day. 270 capsule 1    hydroCHLOROthiazide (Microzide) 12.5 mg tablet Take 1 tablet (12.5 mg) by mouth once daily. 90 tablet 3    insulin aspart (NovoLOG Flexpen U-100 Insulin) 100 unit/mL (3 mL) pen Inject 15 Units under the skin 3 times a day before meals. Take as directed per insulin instructions. 39 mL 3    insulin degludec-liraglutide (Xultophy 100/3.6) 100 unit-3.6 mg /mL (3 mL) insulin pen Inject 40 Units under the skin once daily. 45 mL 3    lisinopril 40 mg tablet TAKE ONE TABLET BY MOUTH EVERY DAY 90 tablet 3    metFORMIN (Glucophage) 1,000 mg tablet TAKE ONE TABLET BY MOUTH TWICE A  tablet 3    metoprolol succinate XL (Toprol-XL) 25 mg 24 hr tablet Take 1 tablet (25 mg) by mouth once daily. 180 tablet 1    omega-3 acid ethyl esters (Lovaza) 1 gram capsule Take 1 capsule (1 g) by mouth once daily. 90 capsule 3    OneTouch Delica Plus Lancet 30 gauge misc       OneTouch Ultra Test strip       pen needle, diabetic (BD Ultra-Fine Mini Pen Needle) 31 gauge x 3/16\" needle TO BE USED 4 TIMES A  each 3    tirzepatide (Mounjaro) 5 mg/0.5 mL pen injector Inject 5 mg under the skin 1 (one) time per week. 2 mL 3    Xarelto 20 mg tablet TAKE ONE TABLET BY MOUTH EVERY EVENING, TAKE WITH MEALS 90 tablet 3     No current facility-administered medications on file prior to visit.        Vitals:    04/10/25 0845   BP: 124/84   Pulse: 50   Temp: 36.3 °C (97.4 °F)   SpO2: 98%     Vitals:    04/10/25 0845   Weight: 107 kg (236 lb)       Review of " Systems   All other systems reviewed and are negative.      Objective     Physical Exam  Constitutional:       Appearance: Normal appearance. He is well-developed.   HENT:      Head: Atraumatic.   Cardiovascular:      Rate and Rhythm: Normal rate and regular rhythm.      Heart sounds: Normal heart sounds. No murmur heard.  Pulmonary:      Effort: Pulmonary effort is normal.      Breath sounds: Normal breath sounds.   Abdominal:      General: Bowel sounds are normal.      Palpations: Abdomen is soft.   Feet:      Right foot:      Skin integrity: Skin integrity normal.      Left foot:      Skin integrity: Skin integrity normal.   Skin:     General: Skin is warm.   Neurological:      General: No focal deficit present.      Mental Status: He is alert.   Psychiatric:         Mood and Affect: Mood normal.     Decreased sensation    No visits with results within 2 Month(s) from this visit.   Latest known visit with results is:   Lab on 12/10/2024   Component Date Value Ref Range Status    Glucose 12/10/2024 129 (H)  74 - 99 mg/dL Final    Sodium 12/10/2024 136  136 - 145 mmol/L Final    Potassium 12/10/2024 4.2  3.5 - 5.3 mmol/L Final    Chloride 12/10/2024 100  98 - 107 mmol/L Final    Bicarbonate 12/10/2024 32  21 - 32 mmol/L Final    Anion Gap 12/10/2024 8 (L)  10 - 20 mmol/L Final    Urea Nitrogen 12/10/2024 16  6 - 23 mg/dL Final    Creatinine 12/10/2024 0.91  0.50 - 1.30 mg/dL Final    eGFR 12/10/2024 90  >60 mL/min/1.73m*2 Final    Calculations of estimated GFR are performed using the 2021 CKD-EPI Study Refit equation without the race variable for the IDMS-Traceable creatinine methods.  https://jasn.asnjournals.org/content/early/2021/09/22/ASN.9028311567    Calcium 12/10/2024 8.9  8.6 - 10.3 mg/dL Final    Albumin 12/10/2024 4.0  3.4 - 5.0 g/dL Final    Alkaline Phosphatase 12/10/2024 39  33 - 136 U/L Final    Total Protein 12/10/2024 6.8  6.4 - 8.2 g/dL Final    AST 12/10/2024 22  9 - 39 U/L Final    Bilirubin,  Total 12/10/2024 0.5  0.0 - 1.2 mg/dL Final    ALT 12/10/2024 22  10 - 52 U/L Final    Patients treated with Sulfasalazine may generate falsely decreased results for ALT.    Cholesterol 12/10/2024 95  0 - 199 mg/dL Final          Age      Desirable   Borderline High   High     0-19 Y     0 - 169       170 - 199     >/= 200    20-24 Y     0 - 189       190 - 224     >/= 225         >24 Y     0 - 199       200 - 239     >/= 240   **All ranges are based on fasting samples. Specific   therapeutic targets will vary based on patient-specific   cardiac risk.    Pediatric guidelines reference:Pediatrics 2011, 128(S5).Adult guidelines reference: NCEP ATPIII Guidelines,VLAD 2001, 258:2486-97    Venipuncture immediately after or during the administration of Metamizole may lead to falsely low results. Testing should be performed immediately prior to Metamizole dosing.    HDL-Cholesterol 12/10/2024 45.8  mg/dL Final      Age       Very Low   Low     Normal    High    0-19 Y    < 35      < 40     40-45     ----  20-24 Y    ----     < 40      >45      ----        >24 Y      ----     < 40     40-60      >60      Cholesterol/HDL Ratio 12/10/2024 2.1   Final      Ref Values  Desirable  < 3.4  High Risk  > 5.0    LDL Calculated 12/10/2024 36  <=99 mg/dL Final                                Near   Borderline      AGE      Desirable  Optimal    High     High     Very High     0-19 Y     0 - 109     ---    110-129   >/= 130     ----    20-24 Y     0 - 119     ---    120-159   >/= 160     ----      >24 Y     0 -  99   100-129  130-159   160-189     >/=190      VLDL 12/10/2024 13  0 - 40 mg/dL Final    Triglycerides 12/10/2024 67  0 - 149 mg/dL Final    Age              Desirable        Borderline         High        Very High  SEX:B           mg/dL             mg/dL               mg/dL      mg/dL  <=14D                       ----               ----        ----  15D-365D                    ----               ----         ----  1Y-9Y           0-74               75-99             >=100       ----  10Y-19Y        0-89                            >=130       ----  20Y-24Y        0-114             115-149             >=150      ----  >= 25Y         0-149             150-199             200-499    >=500      Venipuncture immediately after or during the administration of Metamizole may lead to falsely low results. Testing should be performed immediately prior to Metamizole dosing.    Non HDL Cholesterol 12/10/2024 49  0 - 149 mg/dL Final          Age       Desirable   Borderline High   High     Very High     0-19 Y     0 - 119       120 - 144     >/= 145    >/= 160    20-24 Y     0 - 149       150 - 189     >/= 190      ----         >24 Y    30 mg/dL above LDL Cholesterol goal      Hemoglobin A1C 12/10/2024 6.2 (H)  See comment % Final    Estimated Average Glucose 12/10/2024 131  Not Established mg/dL Final       Assessment/Plan   Problem List Items Addressed This Visit    None  Visit Diagnoses         Codes    Controlled type 2 diabetes mellitus with hyperglycemia, with long-term current use of insulin (Multi)    -  Primary E11.65, Z79.4    Relevant Medications    fluticasone (Flonase) 50 mcg/actuation nasal spray    Other Relevant Orders    Comprehensive metabolic panel    Lipid panel    Hemoglobin A1c    Neuropathy     G62.9    Relevant Medications    fluticasone (Flonase) 50 mcg/actuation nasal spray    Other Relevant Orders    Comprehensive metabolic panel    Lipid panel    Hemoglobin A1c    Primary hypertension     I10    Relevant Medications    fluticasone (Flonase) 50 mcg/actuation nasal spray    Other Relevant Orders    Comprehensive metabolic panel    Lipid panel    Hemoglobin A1c    Hyperlipidemia, mixed     E78.2    Relevant Medications    fluticasone (Flonase) 50 mcg/actuation nasal spray    Other Relevant Orders    Comprehensive metabolic panel    Lipid panel    Hemoglobin A1c          Doing well.  Refilled meds.   Follow up in 4 mo.

## 2025-04-11 LAB
ALBUMIN SERPL-MCNC: 4.3 G/DL (ref 3.6–5.1)
ALBUMIN/CREAT UR: 101 MG/G CREAT
ALP SERPL-CCNC: 34 U/L (ref 35–144)
ALT SERPL-CCNC: 14 U/L (ref 9–46)
ANION GAP SERPL CALCULATED.4IONS-SCNC: 5 MMOL/L (CALC) (ref 7–17)
AST SERPL-CCNC: 17 U/L (ref 10–35)
BILIRUB SERPL-MCNC: 0.5 MG/DL (ref 0.2–1.2)
BUN SERPL-MCNC: 14 MG/DL (ref 7–25)
CALCIUM SERPL-MCNC: 9.6 MG/DL (ref 8.6–10.3)
CHLORIDE SERPL-SCNC: 100 MMOL/L (ref 98–110)
CHOLEST SERPL-MCNC: 108 MG/DL
CHOLEST/HDLC SERPL: 2.2 (CALC)
CO2 SERPL-SCNC: 34 MMOL/L (ref 20–32)
CREAT SERPL-MCNC: 0.78 MG/DL (ref 0.7–1.28)
CREAT UR-MCNC: 67 MG/DL (ref 20–320)
EGFRCR SERPLBLD CKD-EPI 2021: 95 ML/MIN/1.73M2
EST. AVERAGE GLUCOSE BLD GHB EST-MCNC: 151 MG/DL
EST. AVERAGE GLUCOSE BLD GHB EST-SCNC: 8.4 MMOL/L
GLUCOSE SERPL-MCNC: 118 MG/DL (ref 65–99)
HBA1C MFR BLD: 6.9 % OF TOTAL HGB
HDLC SERPL-MCNC: 50 MG/DL
LDLC SERPL CALC-MCNC: 39 MG/DL (CALC)
MICROALBUMIN UR-MCNC: 6.8 MG/DL
NONHDLC SERPL-MCNC: 58 MG/DL (CALC)
POTASSIUM SERPL-SCNC: 4.5 MMOL/L (ref 3.5–5.3)
PROT SERPL-MCNC: 7.1 G/DL (ref 6.1–8.1)
SODIUM SERPL-SCNC: 139 MMOL/L (ref 135–146)
TRIGL SERPL-MCNC: 106 MG/DL

## 2025-05-28 DIAGNOSIS — I10 PRIMARY HYPERTENSION: ICD-10-CM

## 2025-05-28 ASSESSMENT — ENCOUNTER SYMPTOMS
DEPRESSION: 0
RESPIRATORY NEGATIVE: 1
SHORTNESS OF BREATH: 0
COUGH: 0
MEMORY LOSS: 0
SYNCOPE: 0
IRREGULAR HEARTBEAT: 0
LIGHT-HEADEDNESS: 0
BLURRED VISION: 0
FOCAL WEAKNESS: 0
GASTROINTESTINAL NEGATIVE: 1
DECREASED APPETITE: 0
ORTHOPNEA: 0
FALLS: 0
DYSPNEA ON EXERTION: 1

## 2025-05-28 NOTE — PROGRESS NOTES
Chief Complaint/Reason for Visit:   Patient is coming in today as a 6 month Cardiovascular follow up.     History Of Present Illness:    Mr. Norman is coming today as a 6-month cardiovascular follow-up.  We have followed him previously for hypertension, atrial fibrillation, dyslipidemia, cardiomyopathy with an EF of 45%, and abnormal coronary calcium score.  At his last visit, his atorvastatin was reduced to 10 mg nightly.    Patient comes in today feeling well, he denies any recent hospitalizations.  Patient is taking his medication as prescribed.  He denies any chest pain, pressure, palpitations, orthopnea, or edema.  He continues to work full-time.  He gets tired on occasion primarily at the end of the day if he overexerts.  He has some mild dyspnea on exertion after climbing stairs.    Past Medical History:  He has a past medical history of Personal history of other diseases of the respiratory system and Personal history of pulmonary embolism.    Past Surgical History:  He has a past surgical history that includes Other surgical history (08/31/2020) and Other surgical history (10/14/2020).      Social History:  He reports that he quit smoking about 19 years ago. His smoking use included cigarettes and cigars. He has never used smokeless tobacco. He reports that he does not currently use alcohol. He reports that he does not use drugs.    Family History:  Family History   Problem Relation Name Age of Onset    Cancer Mother      Heart attack Father          Allergies:  Patient has no known allergies.    Medications:  Current Outpatient Medications   Medication Instructions    aspirin 81 mg, Daily    atorvastatin (LIPITOR) 10 mg, oral, Daily    blood-glucose sensor (FreeStyle Andrés 3 Sensor) device USE AS DIRECTED EVERY 2 WEEKS    fluticasone (Flonase) 50 mcg/actuation nasal spray 1 spray, Each Nostril, Daily, Shake gently. Before first use, prime pump. After use, clean tip and replace cap.    gabapentin  "(NEURONTIN) 400 mg, oral, 3 times daily    hydroCHLOROthiazide (MICROZIDE) 12.5 mg, oral, Daily    insulin aspart (NOVOLOG FLEXPEN U-100 INSULIN) 15 Units, subcutaneous, 3 times daily before meals, Take as directed per insulin instructions.    insulin degludec-liraglutide (Xultophy 100/3.6) 100 unit-3.6 mg /mL (3 mL) insulin pen 40 Units, subcutaneous, Daily    lisinopril 40 mg, oral, Daily    metFORMIN (GLUCOPHAGE) 1,000 mg, oral, 2 times daily    metoprolol succinate XL (TOPROL-XL) 25 mg, oral, Daily    Mounjaro 5 mg, subcutaneous, Weekly    omega-3 acid ethyl esters (LOVAZA) 1 g, oral, Daily    OneTouch Delica Plus Lancet 30 gauge misc     OneTouch Ultra Test strip     pen needle, diabetic (BD Ultra-Fine Mini Pen Needle) 31 gauge x 3/16\" needle TO BE USED 4 TIMES A DAY    Xarelto 20 mg tablet TAKE ONE TABLET BY MOUTH EVERY EVENING, TAKE WITH MEALS       Review of Systems:  Review of Systems   Constitutional: Positive for malaise/fatigue (gets tired at the end of the day). Negative for decreased appetite.        Works full time.    HENT: Negative.     Eyes:  Negative for blurred vision and visual disturbance.   Cardiovascular:  Positive for dyspnea on exertion (with 2 flights of stairs). Negative for chest pain, irregular heartbeat, leg swelling, orthopnea, palpitations and syncope.   Respiratory: Negative.  Negative for cough and shortness of breath.    Musculoskeletal:  Negative for arthritis and falls.   Gastrointestinal: Negative.    Neurological:  Negative for focal weakness and light-headedness.   Psychiatric/Behavioral:  Negative for depression and memory loss.         Vitals  Visit Vitals  /68 (BP Location: Left arm, Patient Position: Sitting, BP Cuff Size: Adult)   Pulse 69   Ht 1.93 m (6' 4\")   Wt 107 kg (236 lb)   BMI 28.73 kg/m²   Smoking Status Former   BSA 2.4 m²        Physical Exam:  Physical Exam  Constitutional:       Appearance: Normal appearance.   HENT:      Head: Normocephalic.   Eyes: "      Conjunctiva/sclera: Conjunctivae normal.   Cardiovascular:      Rate and Rhythm: Normal rate. Rhythm irregular.      Pulses: Normal pulses.      Heart sounds: S1 normal and S2 normal. No murmur heard.     No friction rub. No gallop.      Comments: Extra systole noted  Pulmonary:      Effort: Pulmonary effort is normal.      Breath sounds: Normal breath sounds.   Abdominal:      General: Bowel sounds are normal.      Palpations: Abdomen is soft.      Tenderness: There is no abdominal tenderness.   Musculoskeletal:      Cervical back: Neck supple.      Right lower leg: No edema.      Left lower leg: No edema.   Skin:     General: Skin is warm and dry.   Neurological:      General: No focal deficit present.      Mental Status: He is alert and oriented to person, place, and time.   Psychiatric:         Attention and Perception: Attention normal.         Mood and Affect: Mood normal.           Last Labs:  CBC -  Lab Results   Component Value Date    WBC 8.2 03/22/2022    HGB 14.7 03/22/2022    HCT 46.6 03/22/2022    MCV 96 03/22/2022     03/22/2022     Lab Results   Component Value Date    GLUCOSE 118 (H) 04/10/2025    CALCIUM 9.6 04/10/2025     04/10/2025    K 4.5 04/10/2025    CO2 34 (H) 04/10/2025     04/10/2025    BUN 14 04/10/2025    CREATININE 0.78 04/10/2025      CMP -  Lab Results   Component Value Date    CALCIUM 9.6 04/10/2025    PROT 7.1 04/10/2025    ALBUMIN 4.3 04/10/2025    AST 17 04/10/2025    ALT 14 04/10/2025    ALKPHOS 34 (L) 04/10/2025    BILITOT 0.5 04/10/2025       LIPID PANEL -   Lab Results   Component Value Date    CHOL 108 04/10/2025    TRIG 106 04/10/2025    HDL 50 04/10/2025    CHHDL 2.2 04/10/2025    LDLF 30 02/28/2023    VLDL 13 12/10/2024    NHDL 58 04/10/2025       Lab Results   Component Value Date    HGBA1C 6.9 (H) 04/10/2025       Last Cardiology Tests:    Echo:7-15-24  CONCLUSIONS:   1. The left ventricular systolic function is low normal, with a visually  estimated ejection fraction of 50-55%.   2. There is global hypokinesis of the left ventricle with minor regional variations.   3. Left ventricular diastolic filling was indeterminate.   4. Mildly enlarged right ventricle.   5. There is normal right ventricular global systolic function.   6. The left atrium is moderate to severely dilated.   7. The right atrium is moderately dilated.    Stress Test:10-5-22  Summary:   1. No clinical or electrocardiographic evidence for ischemia at maximal workload.   2. Nuclear image results are reported separately.   3. The adequate level of stress was achieved.  IMPRESSION:  1. There is a moderate-sized partially reversible perfusion defect in  the inferior and apical walls suggesting prior infarct with a small  area of dwayne-infarct ischemia. No prone imaging available.     2. Calculated ejection fraction of 55% with mild hypokinesis of the  inferior wall.    Lab review: I have personally reviewed the laboratory result(s)     Assessment/Plan:  Chronic atrial fibrillation: Patient has chronic, rate controlled atrial fibrillation.  He is on low-dose metoprolol 25 mg daily and is tolerating well.  Patient has an elevated [id number] score requiring anticoagulation.  He is not having any abnormal bleeding or bruising.  Patient will continue on Xarelto 10 mg daily.    Chronic systolic heart failure: Echocardiogram from July, 2024 showed an ejection fraction of 50-55%.  In 2020 his EF was 45%.  Patient appears well compensated on exam.  He will continue on hydrochlorothiazide, lisinopril, and metoprolol succinate.  He does not require loop diuretics at this time.  He needs to be on a low-sodium diet.    Hypertension: Blood pressure today shows excellent control at 120/68.  Patient will continue on hydrochlorothiazide 12.5 mg daily, lisinopril 40 mg daily, and metoprolol succinate 25 mg daily.  He should be on a heart healthy low-sodium diet.    Dyslipidemia: Lipid labs from April, 2025  shows triglycerides 106, LDL 39.  Patient will continue on atorvastatin 10 mg nightly.  He is due for fasting lipid labs through his PCP office, orders are in place.    Patient will be scheduled to follow-up with Dr. Walker in 6 months.  He has orders in place for lab work through his PCP.  Patient instructed to call with any cardiovascular complaints. All questions were answered.       Dragon dictation was utilized to create this document. Quite often unanticipated grammatical, syntax,  and other interpretive errors are inadvertently transcribed by the computer software.  Please disregard these errors.  Please excuse any errors that have escaped final proofreading.            Nayeli Samson, APRN-CNP

## 2025-05-29 RX ORDER — HYDROCHLOROTHIAZIDE 12.5 MG/1
12.5 TABLET ORAL DAILY
Qty: 90 TABLET | Refills: 3 | Status: SHIPPED | OUTPATIENT
Start: 2025-05-29

## 2025-06-05 ENCOUNTER — OFFICE VISIT (OUTPATIENT)
Dept: CARDIOLOGY | Facility: HOSPITAL | Age: 70
End: 2025-06-05
Payer: COMMERCIAL

## 2025-06-05 VITALS
WEIGHT: 236 LBS | DIASTOLIC BLOOD PRESSURE: 68 MMHG | HEART RATE: 69 BPM | SYSTOLIC BLOOD PRESSURE: 120 MMHG | BODY MASS INDEX: 28.74 KG/M2 | HEIGHT: 76 IN

## 2025-06-05 DIAGNOSIS — I50.22 CHRONIC SYSTOLIC HEART FAILURE: ICD-10-CM

## 2025-06-05 DIAGNOSIS — I48.0 PAROXYSMAL ATRIAL FIBRILLATION (MULTI): ICD-10-CM

## 2025-06-05 DIAGNOSIS — E78.2 MIXED HYPERLIPIDEMIA: ICD-10-CM

## 2025-06-05 DIAGNOSIS — I48.21 PERMANENT ATRIAL FIBRILLATION (MULTI): Primary | ICD-10-CM

## 2025-06-05 DIAGNOSIS — I10 ESSENTIAL HYPERTENSION: ICD-10-CM

## 2025-06-05 PROCEDURE — 3008F BODY MASS INDEX DOCD: CPT | Performed by: NURSE PRACTITIONER

## 2025-06-05 PROCEDURE — 4010F ACE/ARB THERAPY RXD/TAKEN: CPT | Performed by: NURSE PRACTITIONER

## 2025-06-05 PROCEDURE — 1159F MED LIST DOCD IN RCRD: CPT | Performed by: NURSE PRACTITIONER

## 2025-06-05 PROCEDURE — 1160F RVW MEDS BY RX/DR IN RCRD: CPT | Performed by: NURSE PRACTITIONER

## 2025-06-05 PROCEDURE — 3078F DIAST BP <80 MM HG: CPT | Performed by: NURSE PRACTITIONER

## 2025-06-05 PROCEDURE — 99214 OFFICE O/P EST MOD 30 MIN: CPT | Performed by: NURSE PRACTITIONER

## 2025-06-05 PROCEDURE — 1036F TOBACCO NON-USER: CPT | Performed by: NURSE PRACTITIONER

## 2025-06-05 PROCEDURE — 3074F SYST BP LT 130 MM HG: CPT | Performed by: NURSE PRACTITIONER

## 2025-06-05 ASSESSMENT — ENCOUNTER SYMPTOMS: PALPITATIONS: 0

## 2025-06-05 NOTE — PATIENT INSTRUCTIONS
Continue on current meds  Heart healthy, low sodium diet  Mediterranean diet is recommended  Follow up with Dr Walker in 6 months

## 2025-06-19 DIAGNOSIS — Z79.4 CONTROLLED TYPE 2 DIABETES MELLITUS WITH HYPERGLYCEMIA, WITH LONG-TERM CURRENT USE OF INSULIN (MULTI): ICD-10-CM

## 2025-06-19 DIAGNOSIS — E11.65 CONTROLLED TYPE 2 DIABETES MELLITUS WITH HYPERGLYCEMIA, WITH LONG-TERM CURRENT USE OF INSULIN (MULTI): ICD-10-CM

## 2025-06-22 RX ORDER — TIRZEPATIDE 5 MG/.5ML
INJECTION, SOLUTION SUBCUTANEOUS
Qty: 2 ML | Refills: 3 | Status: SHIPPED | OUTPATIENT
Start: 2025-06-22

## 2025-07-10 DIAGNOSIS — Z79.4 CONTROLLED TYPE 2 DIABETES MELLITUS WITH HYPERGLYCEMIA, WITH LONG-TERM CURRENT USE OF INSULIN (MULTI): ICD-10-CM

## 2025-07-10 DIAGNOSIS — G62.9 NEUROPATHY: ICD-10-CM

## 2025-07-10 DIAGNOSIS — E11.65 CONTROLLED TYPE 2 DIABETES MELLITUS WITH HYPERGLYCEMIA, WITH LONG-TERM CURRENT USE OF INSULIN (MULTI): ICD-10-CM

## 2025-07-10 DIAGNOSIS — E78.2 HYPERLIPIDEMIA, MIXED: ICD-10-CM

## 2025-07-10 DIAGNOSIS — I10 PRIMARY HYPERTENSION: ICD-10-CM

## 2025-08-06 DIAGNOSIS — G62.9 NEUROPATHY: ICD-10-CM

## 2025-08-06 DIAGNOSIS — I48.0 PAROXYSMAL ATRIAL FIBRILLATION (MULTI): ICD-10-CM

## 2025-08-06 DIAGNOSIS — M79.605 PAIN IN BOTH LOWER EXTREMITIES: ICD-10-CM

## 2025-08-06 DIAGNOSIS — M79.604 PAIN IN BOTH LOWER EXTREMITIES: ICD-10-CM

## 2025-08-08 RX ORDER — GABAPENTIN 400 MG/1
400 CAPSULE ORAL 3 TIMES DAILY
Qty: 270 CAPSULE | Refills: 1 | OUTPATIENT
Start: 2025-08-08

## 2025-08-08 RX ORDER — RIVAROXABAN 20 MG/1
TABLET, FILM COATED ORAL
Qty: 90 TABLET | Refills: 3 | OUTPATIENT
Start: 2025-08-08

## 2025-08-09 DIAGNOSIS — E11.65 CONTROLLED TYPE 2 DIABETES MELLITUS WITH HYPERGLYCEMIA, WITH LONG-TERM CURRENT USE OF INSULIN (MULTI): ICD-10-CM

## 2025-08-09 DIAGNOSIS — Z79.4 CONTROLLED TYPE 2 DIABETES MELLITUS WITH HYPERGLYCEMIA, WITH LONG-TERM CURRENT USE OF INSULIN (MULTI): ICD-10-CM

## 2025-08-12 ENCOUNTER — APPOINTMENT (OUTPATIENT)
Dept: PRIMARY CARE | Facility: CLINIC | Age: 70
End: 2025-08-12
Payer: COMMERCIAL

## 2025-08-12 ENCOUNTER — TELEPHONE (OUTPATIENT)
Dept: PRIMARY CARE | Facility: CLINIC | Age: 70
End: 2025-08-12

## 2025-08-12 VITALS
WEIGHT: 223.4 LBS | SYSTOLIC BLOOD PRESSURE: 124 MMHG | TEMPERATURE: 97.8 F | DIASTOLIC BLOOD PRESSURE: 74 MMHG | BODY MASS INDEX: 27.19 KG/M2 | OXYGEN SATURATION: 99 % | HEART RATE: 48 BPM

## 2025-08-12 DIAGNOSIS — E11.65 CONTROLLED TYPE 2 DIABETES MELLITUS WITH HYPERGLYCEMIA, WITH LONG-TERM CURRENT USE OF INSULIN (MULTI): ICD-10-CM

## 2025-08-12 DIAGNOSIS — I48.0 PAROXYSMAL ATRIAL FIBRILLATION (MULTI): ICD-10-CM

## 2025-08-12 DIAGNOSIS — E78.2 MIXED HYPERLIPIDEMIA: ICD-10-CM

## 2025-08-12 DIAGNOSIS — J44.9 CHRONIC OBSTRUCTIVE PULMONARY DISEASE, UNSPECIFIED COPD TYPE (MULTI): ICD-10-CM

## 2025-08-12 DIAGNOSIS — Z79.4 TYPE 2 DIABETES MELLITUS WITHOUT COMPLICATION, WITH LONG-TERM CURRENT USE OF INSULIN: ICD-10-CM

## 2025-08-12 DIAGNOSIS — M79.604 PAIN IN BOTH LOWER EXTREMITIES: ICD-10-CM

## 2025-08-12 DIAGNOSIS — E11.9 TYPE 2 DIABETES MELLITUS WITHOUT COMPLICATION, WITH LONG-TERM CURRENT USE OF INSULIN: ICD-10-CM

## 2025-08-12 DIAGNOSIS — Z12.5 SCREENING FOR PROSTATE CANCER: Primary | ICD-10-CM

## 2025-08-12 DIAGNOSIS — I10 PRIMARY HYPERTENSION: ICD-10-CM

## 2025-08-12 DIAGNOSIS — G62.9 NEUROPATHY: ICD-10-CM

## 2025-08-12 DIAGNOSIS — Z79.4 CONTROLLED TYPE 2 DIABETES MELLITUS WITH HYPERGLYCEMIA, WITH LONG-TERM CURRENT USE OF INSULIN (MULTI): ICD-10-CM

## 2025-08-12 DIAGNOSIS — M79.605 PAIN IN BOTH LOWER EXTREMITIES: ICD-10-CM

## 2025-08-12 PROCEDURE — 3078F DIAST BP <80 MM HG: CPT | Performed by: FAMILY MEDICINE

## 2025-08-12 PROCEDURE — 1159F MED LIST DOCD IN RCRD: CPT | Performed by: FAMILY MEDICINE

## 2025-08-12 PROCEDURE — 4010F ACE/ARB THERAPY RXD/TAKEN: CPT | Performed by: FAMILY MEDICINE

## 2025-08-12 PROCEDURE — 3074F SYST BP LT 130 MM HG: CPT | Performed by: FAMILY MEDICINE

## 2025-08-12 PROCEDURE — 99214 OFFICE O/P EST MOD 30 MIN: CPT | Performed by: FAMILY MEDICINE

## 2025-08-12 RX ORDER — METOPROLOL SUCCINATE 25 MG/1
25 TABLET, EXTENDED RELEASE ORAL DAILY
Qty: 180 TABLET | Refills: 1 | Status: SHIPPED | OUTPATIENT
Start: 2025-08-12

## 2025-08-12 RX ORDER — BLOOD-GLUCOSE SENSOR
EACH MISCELLANEOUS
Qty: 9 EACH | Refills: 3 | Status: SHIPPED | OUTPATIENT
Start: 2025-08-12

## 2025-08-12 RX ORDER — BLOOD-GLUCOSE SENSOR
EACH MISCELLANEOUS
Qty: 6 EACH | Refills: 1 | Status: SHIPPED
Start: 2025-08-12 | End: 2025-08-12 | Stop reason: ALTCHOICE

## 2025-08-12 RX ORDER — GABAPENTIN 400 MG/1
400 CAPSULE ORAL 3 TIMES DAILY
Qty: 270 CAPSULE | Refills: 1 | Status: SHIPPED | OUTPATIENT
Start: 2025-08-12 | End: 2026-02-08

## 2025-08-12 RX ORDER — BLOOD-GLUCOSE SENSOR
EACH MISCELLANEOUS
Qty: 6 EACH | Refills: 1 | Status: CANCELLED | OUTPATIENT
Start: 2025-08-12

## 2025-08-12 RX ORDER — TIRZEPATIDE 5 MG/.5ML
INJECTION, SOLUTION SUBCUTANEOUS
Qty: 2 ML | Refills: 3 | Status: SHIPPED | OUTPATIENT
Start: 2025-08-12

## 2025-08-12 RX ORDER — RIVAROXABAN 20 MG/1
TABLET, FILM COATED ORAL
Qty: 90 TABLET | Refills: 3 | Status: SHIPPED | OUTPATIENT
Start: 2025-08-12

## 2025-08-13 LAB
ALBUMIN SERPL-MCNC: 4.1 G/DL (ref 3.6–5.1)
ALP SERPL-CCNC: 36 U/L (ref 35–144)
ALT SERPL-CCNC: 11 U/L (ref 9–46)
ANION GAP SERPL CALCULATED.4IONS-SCNC: 10 MMOL/L (CALC) (ref 7–17)
AST SERPL-CCNC: 14 U/L (ref 10–35)
BILIRUB SERPL-MCNC: 0.6 MG/DL (ref 0.2–1.2)
BUN SERPL-MCNC: 13 MG/DL (ref 7–25)
CALCIUM SERPL-MCNC: 9.6 MG/DL (ref 8.6–10.3)
CHLORIDE SERPL-SCNC: 99 MMOL/L (ref 98–110)
CHOLEST SERPL-MCNC: 98 MG/DL
CHOLEST/HDLC SERPL: 2.1 (CALC)
CO2 SERPL-SCNC: 30 MMOL/L (ref 20–32)
CREAT SERPL-MCNC: 0.88 MG/DL (ref 0.7–1.28)
EGFRCR SERPLBLD CKD-EPI 2021: 91 ML/MIN/1.73M2
EST. AVERAGE GLUCOSE BLD GHB EST-MCNC: 148 MG/DL
EST. AVERAGE GLUCOSE BLD GHB EST-SCNC: 8.2 MMOL/L
GLUCOSE SERPL-MCNC: 100 MG/DL (ref 65–99)
HBA1C MFR BLD: 6.8 %
HDLC SERPL-MCNC: 47 MG/DL
LDLC SERPL CALC-MCNC: 33 MG/DL (CALC)
NONHDLC SERPL-MCNC: 51 MG/DL (CALC)
POTASSIUM SERPL-SCNC: 4.1 MMOL/L (ref 3.5–5.3)
PROT SERPL-MCNC: 7 G/DL (ref 6.1–8.1)
PSA SERPL-MCNC: 0.72 NG/ML
SODIUM SERPL-SCNC: 139 MMOL/L (ref 135–146)
TRIGL SERPL-MCNC: 98 MG/DL

## 2025-08-26 DIAGNOSIS — Z79.4 CONTROLLED TYPE 2 DIABETES MELLITUS WITH HYPERGLYCEMIA, WITH LONG-TERM CURRENT USE OF INSULIN (MULTI): ICD-10-CM

## 2025-08-26 DIAGNOSIS — E11.65 CONTROLLED TYPE 2 DIABETES MELLITUS WITH HYPERGLYCEMIA, WITH LONG-TERM CURRENT USE OF INSULIN (MULTI): ICD-10-CM

## 2025-08-26 DIAGNOSIS — I10 PRIMARY HYPERTENSION: ICD-10-CM

## 2025-08-27 RX ORDER — LISINOPRIL 40 MG/1
40 TABLET ORAL DAILY
Qty: 90 TABLET | Refills: 3 | Status: SHIPPED | OUTPATIENT
Start: 2025-08-27

## 2025-08-27 RX ORDER — METFORMIN HYDROCHLORIDE 1000 MG/1
1000 TABLET ORAL 2 TIMES DAILY
Qty: 180 TABLET | Refills: 3 | Status: SHIPPED | OUTPATIENT
Start: 2025-08-27

## 2025-12-10 ENCOUNTER — APPOINTMENT (OUTPATIENT)
Dept: PRIMARY CARE | Facility: CLINIC | Age: 70
End: 2025-12-10
Payer: COMMERCIAL